# Patient Record
Sex: MALE | Race: WHITE | NOT HISPANIC OR LATINO | Employment: PART TIME | URBAN - METROPOLITAN AREA
[De-identification: names, ages, dates, MRNs, and addresses within clinical notes are randomized per-mention and may not be internally consistent; named-entity substitution may affect disease eponyms.]

---

## 2017-06-05 DIAGNOSIS — K21.9 GASTROESOPHAGEAL REFLUX DISEASE, ESOPHAGITIS PRESENCE NOT SPECIFIED: Primary | ICD-10-CM

## 2017-06-05 RX ORDER — OMEPRAZOLE 40 MG/1
40 CAPSULE, DELAYED RELEASE ORAL DAILY
Qty: 30 CAPSULE | Refills: 6 | Status: SHIPPED | OUTPATIENT
Start: 2017-06-05

## 2017-06-05 RX ORDER — OMEPRAZOLE 40 MG/1
40 CAPSULE, DELAYED RELEASE ORAL DAILY
COMMUNITY
End: 2017-06-05 | Stop reason: SDUPTHER

## 2019-07-19 PROBLEM — D14.1 BENIGN NEOPLASM OF LARYNX: Status: ACTIVE | Noted: 2019-07-19

## 2023-05-10 PROBLEM — Z85.51 HISTORY OF BLADDER CANCER: Status: ACTIVE | Noted: 2023-05-10

## 2023-05-10 PROBLEM — B35.1 ONYCHOMYCOSIS: Status: ACTIVE | Noted: 2023-05-10

## 2023-05-10 PROBLEM — F32.0 CURRENT MILD EPISODE OF MAJOR DEPRESSIVE DISORDER WITHOUT PRIOR EPISODE: Status: ACTIVE | Noted: 2023-05-10

## 2023-05-10 PROBLEM — M51.9 LUMBAR DISC DISEASE: Status: ACTIVE | Noted: 2023-05-10

## 2023-05-10 PROBLEM — N18.31 STAGE 3A CHRONIC KIDNEY DISEASE: Status: ACTIVE | Noted: 2023-05-10

## 2023-05-10 PROBLEM — R25.1 TREMOR: Status: ACTIVE | Noted: 2023-05-10

## 2023-05-10 PROBLEM — N25.81 SECONDARY HYPERPARATHYROIDISM OF RENAL ORIGIN: Status: ACTIVE | Noted: 2023-05-10

## 2023-05-10 PROBLEM — E29.1 HYPOGONADISM IN MALE: Status: ACTIVE | Noted: 2023-05-10

## 2023-05-10 PROBLEM — H40.9 GLAUCOMA: Status: ACTIVE | Noted: 2023-05-10

## 2024-09-24 ENCOUNTER — LAB VISIT (OUTPATIENT)
Dept: LAB | Facility: HOSPITAL | Age: 74
End: 2024-09-24
Attending: UROLOGY
Payer: MEDICARE

## 2024-09-24 DIAGNOSIS — E29.1 3-OXO-5 ALPHA-STEROID DELTA 4-DEHYDROGENASE DEFICIENCY: Primary | ICD-10-CM

## 2024-09-24 DIAGNOSIS — Z85.46 PERSONAL HISTORY OF MALIGNANT NEOPLASM OF PROSTATE: ICD-10-CM

## 2024-09-24 LAB
COMPLEXED PSA SERPL-MCNC: <0.01 NG/ML (ref 0–4)
ERYTHROCYTE [DISTWIDTH] IN BLOOD BY AUTOMATED COUNT: 12.4 % (ref 11.5–14.5)
ESTRADIOL SERPL-MCNC: 11 PG/ML (ref 11–44)
HCT VFR BLD AUTO: 34.9 % (ref 40–54)
HGB BLD-MCNC: 11.4 G/DL (ref 14–18)
MCH RBC QN AUTO: 29.6 PG (ref 27–31)
MCHC RBC AUTO-ENTMCNC: 32.7 G/DL (ref 32–36)
MCV RBC AUTO: 91 FL (ref 82–98)
PLATELET # BLD AUTO: 311 K/UL (ref 150–450)
PMV BLD AUTO: 8.5 FL (ref 9.2–12.9)
RBC # BLD AUTO: 3.85 M/UL (ref 4.6–6.2)
TESTOST SERPL-MCNC: >1500 NG/DL (ref 304–1227)
WBC # BLD AUTO: 13.72 K/UL (ref 3.9–12.7)

## 2024-09-24 PROCEDURE — 36415 COLL VENOUS BLD VENIPUNCTURE: CPT | Performed by: UROLOGY

## 2024-09-24 PROCEDURE — 85027 COMPLETE CBC AUTOMATED: CPT | Performed by: UROLOGY

## 2024-09-24 PROCEDURE — 84403 ASSAY OF TOTAL TESTOSTERONE: CPT | Performed by: UROLOGY

## 2024-09-24 PROCEDURE — 82670 ASSAY OF TOTAL ESTRADIOL: CPT | Performed by: UROLOGY

## 2024-09-24 PROCEDURE — 84153 ASSAY OF PSA TOTAL: CPT | Performed by: UROLOGY

## 2024-11-08 ENCOUNTER — LAB VISIT (OUTPATIENT)
Dept: LAB | Facility: HOSPITAL | Age: 74
End: 2024-11-08
Attending: UROLOGY
Payer: MEDICARE

## 2024-11-08 DIAGNOSIS — E29.1 3-OXO-5 ALPHA-STEROID DELTA 4-DEHYDROGENASE DEFICIENCY: ICD-10-CM

## 2024-11-08 DIAGNOSIS — Z85.46 PERSONAL HISTORY OF MALIGNANT NEOPLASM OF PROSTATE: ICD-10-CM

## 2024-11-08 PROCEDURE — 82679 ASSAY OF ESTRONE: CPT | Performed by: UROLOGY

## 2024-11-12 LAB — ESTRONE SERPL-MCNC: 11 PG/ML

## 2024-11-22 PROBLEM — G93.41 ACUTE METABOLIC ENCEPHALOPATHY: Status: ACTIVE | Noted: 2024-11-22

## 2024-11-22 PROBLEM — Z71.89 ACP (ADVANCE CARE PLANNING): Status: ACTIVE | Noted: 2024-11-22

## 2024-11-22 PROBLEM — D64.9 NORMOCYTIC ANEMIA: Status: ACTIVE | Noted: 2024-11-22

## 2024-11-22 PROBLEM — I10 PRIMARY HYPERTENSION: Status: ACTIVE | Noted: 2024-11-22

## 2024-11-22 PROBLEM — E78.5 HYPERLIPIDEMIA: Status: ACTIVE | Noted: 2024-11-22

## 2024-11-22 PROBLEM — N39.0 COMPLICATED UTI (URINARY TRACT INFECTION): Status: ACTIVE | Noted: 2024-11-22

## 2024-11-25 PROBLEM — Z73.6 LIMITATION OF ACTIVITY DUE TO DISABILITY: Status: ACTIVE | Noted: 2024-11-25

## 2025-01-15 DIAGNOSIS — R41.3 MEMORY LOSS: Primary | ICD-10-CM

## 2025-01-26 ENCOUNTER — TELEPHONE (OUTPATIENT)
Dept: UROLOGY | Facility: CLINIC | Age: 75
End: 2025-01-26
Payer: MEDICARE

## 2025-01-26 NOTE — TELEPHONE ENCOUNTER
New pt scheduled next week noting est care hx bladder cancer    Has see Dr Cristian Tidwell [phone]  As well prior saw Dr Neeraj Cortez ([facility]) 2022 and before    Please request ALL urology records including clinic notes, op and procedure notes, labs, urine tests, pathology reports from BOTH providers in advance of visit

## 2025-02-03 NOTE — PROGRESS NOTES
[facility] New Patient/H&P:     Kushal Morgan is a 74 y.o. male with history of bladder cancer (and prostate cancer) 10 yrs s/p cystectomy and neobladder presents to establish care    He has been followed by Dr Cristian Tidwell for >12 years and presents to establish/transition urologic care  176 pages of urologic medical record received and reviewed.   In summary:    TURBT in 2014, Testopel 2009, inflatable penile prosthesis in 2016, robotic cystectomy with neobladder in March 2015 tiffanie. Incidental neoplasm of prostate noted on pathology specimen for his bladder cancer  Previous surveillance cystoscopy 9/11/19, 3/13/19, 9/12/18 negative  3/13/19 surveillance scope noted moderate bladder neck contracture and mild trabeculation of bladder on 3/13/19.  3/20/20 was noted incontinence related to his prostatectomy with stress incontinence managed with Bentyl and Levbid daily which handles the incontinence well and he does not wear a pad but does leak urine with coughing laughing sneezing or bearing down. Cysto negative  3/10/21:  Surveillance cystoscopy with greater than 5 years no evidence of disease and negative workup with Dr. Cortez recently.  260 mg testosterone cypionate IM q.2 weeks.  Incontinence and low libido reported.  IPP in place.  Surveillance cystoscopy with no lesions in neobladder but ureteral orifices not identified.  Recommended yearly cystoscopy  Record review notes annual cystoscopy at least negative with varying levels of testosterone dosing every 2 weeks from 260-300 mg every 2 weeks with anastrozole use back through 2017.  He was using TriMix intracavernosal injection and vacuum erection device prior to inflatable penile prosthesis placement, which was done in November of 2016 (op report reviewed 11/17/16 with Amicar inflatable penile prosthesis 16 cm device with 1 cm rear-tip extenders used    8/27/19 PSA less than 0.07  9/19/22 creatinine 1.70, EGFR 43, PSA less than 0.1  9/13/23:  PSA  LESS THAN 0.02, TESTOSTERONE 619, CREATININE 1.48, EGFR 50  Renal ultrasound 9/20/16 status post cystectomy had intrinsic medical renal disease but no dilation of collecting systems    Before cystectomy bone scan in 12/20/14 was negative for any evidence of metastases as there was an iliac bone lesion on CT as well as 4-5 mm right lung lesion.  Was concern for clinical T4 disease and was going to see Oncology to discuss adjuvant chemo  TURBT was 12/4/14 in which just above on either side of the prostatic urethral multiple papillary TCC lesions which replace the urothelium radially and papillary fronds extending into the bladder neck radially with urethral encroachment by BPH and tumor burden and there was TCC of the trigone but ureteral orifices were seen and tumor was within 2-3 mm them which had some extension of the papillary lesions to the right bladder wall with a cluster of superficial lesions behind the trigone with a moderately trabeculated bladder and had TURBT/TURP for diagnostics noting invasive high-grade urothelial carcinoma, T1 on initial resection  Neobladder and cystectomy noted negative nodes and pathologic T 0 but anatomic T3 lesion and prostate adenocarcinoma [place] 6 unless than 5% of the volume  Prior to diagnosis of bladder cancer is being followed for BPH/LUTS as well as ED and testosterone replacement since at least 2013 at which time he had a PSA of 2.45    He was last seen 10/16/24 by Dr. Rodriguez for six-month checkup noting radical cystoprostatectomy with neobladder 9 years ago for large tumor but was T1 and the patient has noticed some urinary frequency and leakage over the last 3 months.  There was an incidental neoplasm of prostate on pathology specimen and [place] 3 + 3 and less than 5% with last PSA less than 0.1.  Penile prosthesis done in 2016 and the patient does not see the device outside the skin.  His most recent testosterone level was 133 currently using 140 mg of testosterone  cypionate it was not sure of the timing between labs and injection currently satisfied with his therapy bladder ultrasound done with neobladder not distended and renal ultrasound reviewed with mild renal sitting but no hydronephrosis  Chart reviewed with Q six-month visits leading up to the most recent visit without any significant change in management, though on September 22 visit was noted that left cylinder was mildly underinflated upon patient inflating however with the additional pumps did inflate to adequate rigidity.  Reassurance was provided.    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  Currently being worked up for memory loss and dementia. Wife provides majority of history  Has Alzheimer's in symptoms but testing has been negative including an ATN profile which had mild increase in beta amyloid but was otherwise unremarkable on 1/6/25. Has been seeing Dr Booker neurology and has pending consultation with Dr Quezada dementia neurology specialist [place] [facility]11[place]25.  Reports hospitalization for UTI in 11/20/24 and never got back to baseline mental status after discharge    On review 11/23-11/25 [facility] admit   presents to the ED for evaluation of continuing weakness and confusion. Patient's wife reports that the patient has been experiencing cognitive decline for the past year. Symptoms include confusion, blankly staring at objects, weakness, shuffling gait, and falling. Wife reports a fall last night and the day before. Wife reports patient underwent an MRI recently that showed no acute findings. Labs in ED reveal acute cystitis and patient is currently confused and required ativan.   - UA was abnormal. Elevated pro calcitonin noted. Renal us with stable findings. Patient was initiated on Rocephin and Doxy. Urine and blood cultures monitored. Negative viral respiratory panel. Baseline hx of progressive cognitive decline and following with neurology  outpatient (Dr. Harden) with evaluation on going. Urine culture grew MSSA  - Antibiotics tailored accordingly. Blood cultures remained negative. No clinical s/s of underlying deep seated infection and no high risk factors for IE and no WOLFGANG required. Patient remained stable from infection control stand point. Patient with some sundowning/nocturnal delirium and supportive care provided. Patient's mentation slowly improved   Urine culture was greater than 100,000 colony-forming units of Staph aureus  No dysuria since that time.  No distinct urinary complaints except for incontinence.  Signs of dementia with progressive, but neurologic evaluation with note of early dementia in October of 2024 prior to hospital admission.  Has had some baseline stress incontinence as above per chart review, but now notes he is leaking more at night.  No real daytime incontinence, and does wear depend during the day for convenience.  At night, can use up to 5.  Only occasional mucus in the urine  Has been trying to stop fluid intake 2 hours before bed but does wake up at night and eat and drink.  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  Has continued testosterone replacement and anastrozole but wonders if needs it  Taking hyoscyamine twice daily  Renal function review 11/25/24 at time of hospital discharge creatinine 1.86, EGFR 38.  At baseline.    Main questions/concerns at this visit:  - management of his significant nocturnal incontinence (inquired if TID sudafed would help as has been told)  - what medications does he destiny to be on and what may contribute, geovanna in setting of decline in mental status    Past Medical History:   Diagnosis Date    Anesthesia complication     recover from versed very slowly    Cancer     bladder and prostate    Depression     GERD  "(gastroesophageal reflux disease)     Glaucoma     Hyperlipidemia     Hypertension     "benign"/ no current meds    Incontinent of urine     under anesthesia    Insomnia     Intractable hiccups     during chemo    Lumbar disc disease     KANNAN (obstructive sleep apnea)     noncompliant with CPAP    PONV (postoperative nausea and vomiting)     Renal disorder        Past Surgical History:   Procedure Laterality Date    BLADDER SURGERY      removal of bladder due to bladder CA, creation of bladder using intestines.    COLONOSCOPY  10/14/2024    DIRECT LARYNGOSCOPY N/A 07/19/2019    Procedure: LARYNGOSCOPY, DIRECT;  Surgeon: Jace Stephenson MD;  Location: [facility];  Service: ENT;  Laterality: N/A;    ESOPHAGOSCOPY N/A 07/19/2019    Procedure: ESOPHAGOSCOPY;  Surgeon: Jace Stephenson MD;  Location: [facility];  Service: ENT;  Laterality: N/A;    HERNIA REPAIR Left 2016    inguinal    MICROLARYNGOSCOPY WITH PROCEDURE USING LASER  07/19/2019    Procedure: MICROLARYNGOSCOPY, WITH PROCEDURE USING LASER;  Surgeon: Jace Stephenson MD;  Location: [facility];  Service: ENT;;    PENILE PROSTHESIS IMPLANT  2018    PORTACATH PLACEMENT Right     cw    PROSTATECTOMY      ROTATOR CUFF REPAIR      right       Family History   Problem Relation Name Age of Onset    Cancer Mother          breast    Cancer Father          sarcoma    Depression Sister      No Known Problems Brother jazmin     Depression Brother luisana     Depression Brother maggi     Stroke Maternal Grandmother         Social History     Socioeconomic History    Marital status:    Tobacco Use    Smoking status: Never    Smokeless tobacco: Never   Substance and Sexual Activity    Alcohol use: Yes     Alcohol/week: 14.0 standard drinks of alcohol     Types: 14 Glasses of wine per week     Comment: daily- share bottle wine with wife    Drug use: No     Social Drivers of Health     Food Insecurity: No Food Insecurity (11/23/2024)    Hunger Vital Sign     Worried About " "Running Out of Food in the Last Year: Never true     Ran Out of Food in the Last Year: Never true   Transportation Needs: No Transportation Needs (11/23/2024)    TRANSPORTATION NEEDS     Transportation : No   Housing Stability: High Risk (11/23/2024)    Housing Stability Vital Sign     Unable to Pay for Housing in the Last Year: Yes     Homeless in the Last Year: Yes       Review of patient's allergies indicates:  No Known Allergies    Medications Reviewed: see MAR    Focused Physical Exam    Vitals:    02/04/25 1046   BP: 124/77     Body mass index is 24.14 kg/m². Weight: 59.9 kg (132 lb) Height: 5' 2" (157.5 cm)       Abdomen: Soft, non-tender, nondistended, no CVA tenderness    LABS:    Recent Results (from the past 2 weeks)   POCT Bladder Scan    Collection Time: 02/04/25 10:52 AM   Result Value Ref Range    POC Residual Urine Volume 45 0 - 100 mL   POCT Urinalysis(Instrument)    Collection Time: 02/04/25 10:53 AM   Result Value Ref Range    Color, POC UA Yellow Yellow, Straw, Colorless    Clarity, POC UA Clear Clear    Glucose, POC UA Negative Negative    Bilirubin, POC UA Negative Negative    Ketones, POC UA Negative Negative    Spec Grav POC UA 1.025 1.005 - 1.030    Blood, POC UA Moderate (A) Negative    pH, POC UA 5.5 5.0 - 8.0    Protein, POC  (A) Negative    Urobilinogen, POC UA 0.2 <=1.0    Nitrite, POC UA Negative Negative    WBC, POC UA Moderate (A) Negative           Assessment/Diagnosis:    1. History of bladder cancer  POCT Urinalysis(Instrument)    POCT Bladder Scan    Basic Metabolic Panel    Urine Culture High Risk      2. History of prostate cancer  Prostate Specific Antigen, Diagnostic      3. Cells and casts in urine  Urine Culture High Risk          Plans:    EXTENSIVE review of prior urologic medical record of last 12 years, and long discussion with pt and his wife  Main concern in his clinical care at this time is his dementia/neurologic status  Main urologic concern is his nocturnal " incontinence    In regards to his history of prostate cancer and bladder cancer, reviewed records indicating nonmuscle invasive disease just concern for T3 high-grade T1, as initial concern was T4, but ultimately staged as T3, and pathology was tea 0 at time of radical cystoprostatectomy.  Incidental finding of low-grade prostate cancer, not uncommon at time of cystectomy.  He has finished surveillance for his bladder cancer, and his PSA has remained undetectable as expected.  No oncologic concerns at this time.    He does have neobladder, and I was very upfront with the patient and his wife that advanced bladder cancer such as cystectomy and diversion is the 1 thing not typically managed in his practice, and typically patients with ileal conduit seen.  Little experience with neobladder, but given bowel segment and different receptors, medical management does defer.  I am not familiar with the question of if Sudafed may decrease incontinence in the neobladder, and certainly given his issue is not during the day and only nighttime, sometimes desmopressin has been utilized, and will review neobladder incontinence management with urologic oncology for further recs    In interim reviewed all conservative recommendations for urgency and frequency, and certainly contributing to nocturia could be his evening alcohol intake, and drinking through the night.  Distinctly recommended stopping fluid intake 2 hours before bed, minimizing bladder irritants in the afternoon and evening hours, and not drinking through the night.  All conservative recommendations for urgency and frequency were reviewed and provided in writing.    As well we did review his testosterone replacement therapy.  Slight risk of increase of recurrence of prostate cancer at though with small amount of low-grade disease resected many years ago an undetectable PSA despite testosterone placement, no distinct concerns there.  Unclear of the history leading to  testosterone replacement but largely noted was utilized for ED, but has since had penile prosthesis, and therefore risks likely outweigh the benefits, especially if his dosing has been adjusted so much that it doses too high for which he needs anastrozole for the peripheral conversion to estrogen, and at this time mutually discussed discontinuing testosterone replacement and anastrozole.    In regards to medications that may contribute to dementia or any other problems, recommended he stop hycosamine at this time given an anticholinergic effect which can affect his mentation.  As well, recommended distinct review of all medications he is on with the neurologist at his visit next week.    Given urine dipstick today, and admission a few months ago with concern for UTI/urosepsis, will send a urine culture    A routine six-month follow-up was set.    In the interim will reach out to Urologic Oncology regarding recommendations for neobladder nocturnal incontinence      Total time spent in/on encounter today, including face to face time with patient, counseling, medical record review, interpretation of tests/results, , and treatment plan coordination: 140 minutes    *Visit today included increased complexity associated with the current or anticipated ongoing medical longitudinal care and management related to this patient's serious and/or complex managed problem(s) as described above.

## 2025-02-03 NOTE — TELEPHONE ENCOUNTER
Personally spoke with Dr Yaw Wick she voiced several papers was faxed as pt has a large file has been a pt there for  10 yrs.  She will try to refax and was also provided my personal work email

## 2025-02-04 ENCOUNTER — OFFICE VISIT (OUTPATIENT)
Dept: UROLOGY | Facility: CLINIC | Age: 75
End: 2025-02-04
Payer: MEDICARE

## 2025-02-04 ENCOUNTER — LAB VISIT (OUTPATIENT)
Dept: LAB | Facility: HOSPITAL | Age: 75
End: 2025-02-04
Attending: UROLOGY
Payer: MEDICARE

## 2025-02-04 VITALS
SYSTOLIC BLOOD PRESSURE: 124 MMHG | WEIGHT: 132 LBS | DIASTOLIC BLOOD PRESSURE: 77 MMHG | BODY MASS INDEX: 24.29 KG/M2 | HEIGHT: 62 IN

## 2025-02-04 DIAGNOSIS — R82.998 CELLS AND CASTS IN URINE: ICD-10-CM

## 2025-02-04 DIAGNOSIS — Z85.46 HISTORY OF PROSTATE CANCER: ICD-10-CM

## 2025-02-04 DIAGNOSIS — Z85.51 HISTORY OF BLADDER CANCER: Primary | ICD-10-CM

## 2025-02-04 DIAGNOSIS — R82.998 CELLS AND CASTS IN URINE: Primary | ICD-10-CM

## 2025-02-04 DIAGNOSIS — Z85.51 HISTORY OF BLADDER CANCER: ICD-10-CM

## 2025-02-04 LAB
ANION GAP SERPL CALC-SCNC: 9 MMOL/L (ref 8–16)
BILIRUBIN, UA POC OHS: NEGATIVE
BLOOD, UA POC OHS: ABNORMAL
BUN SERPL-MCNC: 36 MG/DL (ref 8–23)
CALCIUM SERPL-MCNC: 9.6 MG/DL (ref 8.7–10.5)
CHLORIDE SERPL-SCNC: 104 MMOL/L (ref 95–110)
CLARITY, UA POC OHS: CLEAR
CO2 SERPL-SCNC: 24 MMOL/L (ref 23–29)
COLOR, UA POC OHS: YELLOW
COMPLEXED PSA SERPL-MCNC: <0.01 NG/ML (ref 0–4)
CREAT SERPL-MCNC: 1.6 MG/DL (ref 0.5–1.4)
EST. GFR  (NO RACE VARIABLE): 45 ML/MIN/1.73 M^2
GLUCOSE SERPL-MCNC: 88 MG/DL (ref 70–110)
GLUCOSE, UA POC OHS: NEGATIVE
KETONES, UA POC OHS: NEGATIVE
LEUKOCYTES, UA POC OHS: ABNORMAL
NITRITE, UA POC OHS: NEGATIVE
PH, UA POC OHS: 5.5
POC RESIDUAL URINE VOLUME: 45 ML (ref 0–100)
POTASSIUM SERPL-SCNC: 4.8 MMOL/L (ref 3.5–5.1)
PROTEIN, UA POC OHS: 100
SODIUM SERPL-SCNC: 137 MMOL/L (ref 136–145)
SPECIFIC GRAVITY, UA POC OHS: 1.02
UROBILINOGEN, UA POC OHS: 0.2

## 2025-02-04 PROCEDURE — 87086 URINE CULTURE/COLONY COUNT: CPT | Mod: GA | Performed by: UROLOGY

## 2025-02-04 PROCEDURE — 84153 ASSAY OF PSA TOTAL: CPT | Performed by: UROLOGY

## 2025-02-04 PROCEDURE — 3008F BODY MASS INDEX DOCD: CPT | Mod: CPTII,S$GLB,, | Performed by: UROLOGY

## 2025-02-04 PROCEDURE — 81003 URINALYSIS AUTO W/O SCOPE: CPT | Mod: QW,S$GLB,, | Performed by: UROLOGY

## 2025-02-04 PROCEDURE — G2211 COMPLEX E/M VISIT ADD ON: HCPCS | Mod: S$GLB,,, | Performed by: UROLOGY

## 2025-02-04 PROCEDURE — 36415 COLL VENOUS BLD VENIPUNCTURE: CPT | Performed by: UROLOGY

## 2025-02-04 PROCEDURE — 3078F DIAST BP <80 MM HG: CPT | Mod: CPTII,S$GLB,, | Performed by: UROLOGY

## 2025-02-04 PROCEDURE — 99999 PR PBB SHADOW E&M-EST. PATIENT-LVL III: CPT | Mod: PBBFAC,,, | Performed by: UROLOGY

## 2025-02-04 PROCEDURE — 51798 US URINE CAPACITY MEASURE: CPT | Mod: S$GLB,,, | Performed by: UROLOGY

## 2025-02-04 PROCEDURE — 1126F AMNT PAIN NOTED NONE PRSNT: CPT | Mod: CPTII,S$GLB,, | Performed by: UROLOGY

## 2025-02-04 PROCEDURE — 99205 OFFICE O/P NEW HI 60 MIN: CPT | Mod: S$GLB,,, | Performed by: UROLOGY

## 2025-02-04 PROCEDURE — 1159F MED LIST DOCD IN RCRD: CPT | Mod: CPTII,S$GLB,, | Performed by: UROLOGY

## 2025-02-04 PROCEDURE — 80048 BASIC METABOLIC PNL TOTAL CA: CPT | Performed by: UROLOGY

## 2025-02-04 PROCEDURE — 4010F ACE/ARB THERAPY RXD/TAKEN: CPT | Mod: CPTII,S$GLB,, | Performed by: UROLOGY

## 2025-02-04 PROCEDURE — 3074F SYST BP LT 130 MM HG: CPT | Mod: CPTII,S$GLB,, | Performed by: UROLOGY

## 2025-02-04 PROCEDURE — 87088 URINE BACTERIA CULTURE: CPT | Performed by: UROLOGY

## 2025-02-04 PROCEDURE — 99417 PROLNG OP E/M EACH 15 MIN: CPT | Mod: S$GLB,,, | Performed by: UROLOGY

## 2025-02-04 RX ORDER — BIMATOPROST 0.3 MG/ML
1 SOLUTION/ DROPS OPHTHALMIC NIGHTLY
COMMUNITY

## 2025-02-04 RX ORDER — ERGOCALCIFEROL 1.25 MG/1
50000 CAPSULE ORAL
COMMUNITY
Start: 2024-12-20

## 2025-02-04 RX ORDER — METOCLOPRAMIDE 5 MG/1
5 TABLET ORAL 4 TIMES DAILY
COMMUNITY
Start: 2025-01-10

## 2025-02-06 LAB — BACTERIA UR CULT: ABNORMAL

## 2025-02-09 DIAGNOSIS — R30.0 DYSURIA: Primary | ICD-10-CM

## 2025-02-10 ENCOUNTER — TELEPHONE (OUTPATIENT)
Dept: UROLOGY | Facility: CLINIC | Age: 75
End: 2025-02-10
Payer: MEDICARE

## 2025-02-10 NOTE — TELEPHONE ENCOUNTER
Pts wife informed of md akhtar  Lab orders scheduled     Uti admit in nov was >100k staph aureus  So low colony count coag neg staph unlikely to be clinically sig UTI  But if concern for symtpoms, can repeat.  [place]ives [place] so can do [place] lab visit for ua, ua micro, ucx - all 3 order placed as lab collect so can schedule/assoc all per pt convenience  Make sure asks lab for towelette, and pt collects midstream (voiding into toilet a bit first before collecting in cup)

## 2025-02-11 ENCOUNTER — OFFICE VISIT (OUTPATIENT)
Dept: NEUROLOGY | Facility: CLINIC | Age: 75
End: 2025-02-11
Payer: MEDICARE

## 2025-02-11 VITALS — WEIGHT: 134.5 LBS | OXYGEN SATURATION: 98 % | BODY MASS INDEX: 24.75 KG/M2 | HEIGHT: 62 IN

## 2025-02-11 DIAGNOSIS — M51.9 LUMBAR DISC DISEASE: ICD-10-CM

## 2025-02-11 DIAGNOSIS — R41.3 MEMORY LOSS: ICD-10-CM

## 2025-02-11 DIAGNOSIS — N39.0 COMPLICATED UTI (URINARY TRACT INFECTION): ICD-10-CM

## 2025-02-11 DIAGNOSIS — Z85.51 HISTORY OF BLADDER CANCER: ICD-10-CM

## 2025-02-11 DIAGNOSIS — F03.90 DEMENTIA, UNSPECIFIED DEMENTIA SEVERITY, UNSPECIFIED DEMENTIA TYPE, UNSPECIFIED WHETHER BEHAVIORAL, PSYCHOTIC, OR MOOD DISTURBANCE OR ANXIETY: Primary | ICD-10-CM

## 2025-02-11 DIAGNOSIS — E78.5 HYPERLIPIDEMIA, UNSPECIFIED HYPERLIPIDEMIA TYPE: ICD-10-CM

## 2025-02-11 PROCEDURE — 1126F AMNT PAIN NOTED NONE PRSNT: CPT | Mod: CPTII,S$GLB,, | Performed by: STUDENT IN AN ORGANIZED HEALTH CARE EDUCATION/TRAINING PROGRAM

## 2025-02-11 PROCEDURE — 1101F PT FALLS ASSESS-DOCD LE1/YR: CPT | Mod: CPTII,S$GLB,, | Performed by: STUDENT IN AN ORGANIZED HEALTH CARE EDUCATION/TRAINING PROGRAM

## 2025-02-11 PROCEDURE — 4010F ACE/ARB THERAPY RXD/TAKEN: CPT | Mod: CPTII,S$GLB,, | Performed by: STUDENT IN AN ORGANIZED HEALTH CARE EDUCATION/TRAINING PROGRAM

## 2025-02-11 PROCEDURE — 3288F FALL RISK ASSESSMENT DOCD: CPT | Mod: CPTII,S$GLB,, | Performed by: STUDENT IN AN ORGANIZED HEALTH CARE EDUCATION/TRAINING PROGRAM

## 2025-02-11 PROCEDURE — 1160F RVW MEDS BY RX/DR IN RCRD: CPT | Mod: CPTII,S$GLB,, | Performed by: STUDENT IN AN ORGANIZED HEALTH CARE EDUCATION/TRAINING PROGRAM

## 2025-02-11 PROCEDURE — 99205 OFFICE O/P NEW HI 60 MIN: CPT | Mod: S$GLB,,, | Performed by: STUDENT IN AN ORGANIZED HEALTH CARE EDUCATION/TRAINING PROGRAM

## 2025-02-11 PROCEDURE — 1159F MED LIST DOCD IN RCRD: CPT | Mod: CPTII,S$GLB,, | Performed by: STUDENT IN AN ORGANIZED HEALTH CARE EDUCATION/TRAINING PROGRAM

## 2025-02-11 PROCEDURE — 3008F BODY MASS INDEX DOCD: CPT | Mod: CPTII,S$GLB,, | Performed by: STUDENT IN AN ORGANIZED HEALTH CARE EDUCATION/TRAINING PROGRAM

## 2025-02-11 PROCEDURE — 99999 PR PBB SHADOW E&M-EST. PATIENT-LVL IV: CPT | Mod: PBBFAC,,, | Performed by: STUDENT IN AN ORGANIZED HEALTH CARE EDUCATION/TRAINING PROGRAM

## 2025-02-11 RX ORDER — MEMANTINE HYDROCHLORIDE 5 MG/1
5 TABLET ORAL 2 TIMES DAILY
Qty: 60 TABLET | Refills: 11 | Status: SHIPPED | OUTPATIENT
Start: 2025-02-11 | End: 2026-02-11

## 2025-02-11 RX ORDER — BENZONATATE 200 MG/1
200 CAPSULE ORAL 3 TIMES DAILY PRN
COMMUNITY

## 2025-02-12 PROBLEM — F03.90 DEMENTIA, UNSPECIFIED DEMENTIA SEVERITY, UNSPECIFIED DEMENTIA TYPE, UNSPECIFIED WHETHER BEHAVIORAL, PSYCHOTIC, OR MOOD DISTURBANCE OR ANXIETY: Status: ACTIVE | Noted: 2025-02-12

## 2025-02-12 NOTE — PROGRESS NOTES
Patient Name: Kushal Morgan  MRN: [id number]    CC: Cognitive dysfunction     HPI: Kushal Morgan is a 74 y.o. male with medical history of Bladder CA, HTN, HLD, LDDD, GERD, mood disorder presenting to the neurology clinic for establishment of care with myself for further evaluation and management of cognitive dysfunction.     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 Referral from Leoncio Booker - Neurologist at [place].     Reported of symptoms pertinent to memory deficits over an year - since Jan 2024, insidious onset, with gradual worsening / evident symptoms. Pattern of memory loss - recent recall followed by immediate recall, with intact remote memory. However, since UTI in October 2025 - had significant decline in cognitive function as per significant other. Symptoms in spectrum of poor attention / concentration / focus / mood - events of blank stare's - delayed response to questions; wo affect in level of consciousness. Reports of less motivation / interest in major of his prior usual activities / poor socialization. Added concerns includes word finding difficulties during conversations; with spontaneous random fill in gaps in conversations; wo rationale thinking and abstraction. Memory of vocabulary and concepts appears intact. Procedural memory and motor learning appears relatively spared; however significant disorganization in thoughts and actions. Independent - able to take of day to day activities wo any obvious apraxia or agnosia - however events of affect in following commands (Ex - act of putting trash in closet rather in kitchen). Mood factors of irritability reported / wo significant agitations or emotional outbursts. Events of in-appropriate behavior was reported / walking down the stairs in underwear / not realizing the day of the time. However no clear patterns of any other disinhibition or hyperorality or compulsive behaviors reported. Denied any  "concerns for hallucinations or delusions. No obvious signs or symptom spectrum of parkinsonism or PSP / MSA / CBS. Denied any clinical seizures or history of stroke / epilepsy and complex migraines.     Reported of extensive serum / advanced neuroimaging investigations performed prior to the visit / however no documentation scanned to [facility].     ROS:   Review of Systems   Constitutional: Positive for malaise/fatigue. Negative for weight loss.   Eyes: Negative for blurred vision and double vision.   Respiratory: Cardiovascular: Negative / no active reportable concerns.   Gastrointestinal: Genitourinary: Negative / no active reportable concerns.   Musculoskeletal: Negative for joint pain. Negative for myalgias and falls.   Neurological:  Negative for focal weakness and seizures.   Psychiatric/Behavioral: Positive for memory loss. Positive for depression and negative for hallucinations. The patient is not nervous/anxious.    Past Medical History  Past Medical History:   Diagnosis Date    Anesthesia complication     recover from versed very slowly    Cancer     bladder and prostate    Depression     GERD (gastroesophageal reflux disease)     Glaucoma     Hyperlipidemia     Hypertension     "benign"/ no current meds    Incontinent of urine     under anesthesia    Insomnia     Intractable hiccups     during chemo    Lumbar disc disease     KANNAN (obstructive sleep apnea)     noncompliant with CPAP    PONV (postoperative nausea and vomiting)     Renal disorder        Medications    Current Outpatient Medications:     atorvastatin (LIPITOR) 10 MG tablet, Take 10 mg by mouth every Mon, Wed, Fri. PM, Disp: , Rfl:     benzonatate (TESSALON) 200 MG capsule, Take 200 mg by mouth 3 (three) times daily as needed for Cough., Disp: , Rfl:     bimatoprost (LUMIGAN) 0.03 % ophthalmic drops, Place 1 drop into both eyes every evening., Disp: , Rfl:     brimonidine 0.2% (ALPHAGAN) 0.2 % Drop, Place 1 drop into both eyes 2 (two) " times daily. , Disp: , Rfl:     buPROPion (WELLBUTRIN XL) 300 MG 24 hr tablet, Take 300 mg by mouth every morning., Disp: , Rfl:     cyanocobalamin 1,000 mcg/mL injection, Inject 1,500 mcg into the muscle every 14 (fourteen) days., Disp: , Rfl:     dorzolamide-timolol 2-0.5% (COSOPT) 22.3-6.8 mg/mL ophthalmic solution, Place 1 drop into both eyes 2 (two) times daily. , Disp: , Rfl:     ergocalciferol (ERGOCALCIFEROL) 50,000 unit Cap, Take 50,000 Units by mouth every 7 days., Disp: , Rfl:     famotidine (PEPCID) 20 MG tablet, Take 20 mg by mouth 2 (two) times daily as needed for Heartburn., Disp: , Rfl:     gabapentin (NEURONTIN) 300 MG capsule, Take 1 capsule (300 mg total) by mouth 2 (two) times daily., Disp: , Rfl:     lansoprazole (PREVACID) 30 MG capsule, Take 30 mg by mouth 2 (two) times daily., Disp: , Rfl:     meloxicam (MOBIC) 15 MG tablet, Take 15 mg by mouth once daily., Disp: , Rfl:     metoclopramide HCl (REGLAN) 5 MG tablet, Take 5 mg by mouth 4 (four) times daily., Disp: , Rfl:     ramipriL (ALTACE) 2.5 MG capsule, Take 2.5 mg by mouth once daily., Disp: , Rfl:     traZODone (DESYREL) 100 MG tablet, Take 100-200 mg by mouth every evening., Disp: , Rfl:     memantine (NAMENDA) 5 MG Tab, Take 1 tablet (5 mg total) by mouth 2 (two) times daily., Disp: 60 tablet, Rfl: 11    Allergies  Review of patient's allergies indicates:  No Known Allergies    Social History  Social History     Socioeconomic History    Marital status:    Tobacco Use    Smoking status: Never    Smokeless tobacco: Never   Substance and Sexual Activity    Alcohol use: Yes     Alcohol/week: 14.0 standard drinks of alcohol     Types: 14 Glasses of wine per week     Comment: daily- share bottle wine with wife    Drug use: No     Social Drivers of Health     Food Insecurity: No Food Insecurity (11/23/2024)    Hunger Vital Sign     Worried About Running Out of Food in the Last Year: Never true     Ran Out of Food in the Last Year: Never  "true   Transportation Needs: No Transportation Needs (11/23/2024)    TRANSPORTATION NEEDS     Transportation : No   Housing Stability: High Risk (11/23/2024)    Housing Stability Vital Sign     Unable to Pay for Housing in the Last Year: Yes     Homeless in the Last Year: Yes       Family History  Family History   Problem Relation Name Age of Onset    Cancer Mother          breast    Cancer Father          sarcoma    Depression Sister      No Known Problems Brother jazmin     Depression Brother luisana     Depression Brother maggi     Stroke Maternal Grandmother         Physical Exam  Ht 5' 2" (1.575 m)   Wt 61 kg (134 lb 7.7 oz)   SpO2 98%   BMI 24.60 kg/m²     General appearance: Well-developed, well-groomed.     Neurologic Exam: The patient is awake, alert and oriented. Language is fluent. Attention span and concentration are normal.  MOCA 22/30 - 0/5 delayed recall / orientation 4/6 / executive - error on copy cube     Cranial nerves: Visual fields are full to confrontation. Ocular motility is full in all cardinal positions of gaze. Facial activation is symmetric. Hearing is normal bilaterally. Shoulder elevation is symmetric and full strength bilaterally.     Motor examination of all extremities demonstrates normal bulk and tone in all four limbs. There are no atrophy or fasciculations. Strength is 5/5 in the upper and lower extremities bilaterally without pronator drift.     Sensory examination is normal to touch in the upper and lower extremities bilaterally.      Gait: Normal casual gait.    Coordination: Finger to nose is normal in both upper extremities. No resting tremor or dyskinesia.     General exam  Cardiovascular:  Orthostatic BP: N/A    Lab and Test Results    WBC   Date Value Ref Range Status   11/25/2024 9.05 3.90 - 12.70 K/uL Final   11/24/2024 8.72 3.90 - 12.70 K/uL Final   11/23/2024 8.83 3.90 - 12.70 K/uL Final     Hemoglobin   Date Value Ref Range Status   11/25/2024 11.1 (L) 14.0 - 18.0 g/dL " Final   11/24/2024 10.6 (L) 14.0 - 18.0 g/dL Final   11/23/2024 10.0 (L) 14.0 - 18.0 g/dL Final     Hematocrit   Date Value Ref Range Status   11/25/2024 34.1 (L) 40.0 - 54.0 % Final   11/24/2024 32.0 (L) 40.0 - 54.0 % Final   11/23/2024 29.8 (L) 40.0 - 54.0 % Final     Platelets   Date Value Ref Range Status   11/25/2024 222 150 - 450 K/uL Final   11/24/2024 187 150 - 450 K/uL Final   11/23/2024 184 150 - 450 K/uL Final     Glucose   Date Value Ref Range Status   02/04/2025 88 70 - 110 mg/dL Final   11/25/2024 154 (H) 70 - 110 mg/dL Final     Comment:     The ADA recommends the following guidelines for fasting glucose:    Normal:       less than 100 mg/dL    Prediabetes:  100 mg/dL to 125 mg/dL    Diabetes:     126 mg/dL or higher     11/24/2024 130 (H) 70 - 110 mg/dL Final     Comment:     The ADA recommends the following guidelines for fasting glucose:    Normal:       less than 100 mg/dL    Prediabetes:  100 mg/dL to 125 mg/dL    Diabetes:     126 mg/dL or higher       Sodium   Date Value Ref Range Status   02/04/2025 137 136 - 145 mmol/L Final   11/25/2024 141 136 - 145 mmol/L Final   11/24/2024 139 136 - 145 mmol/L Final     Potassium   Date Value Ref Range Status   02/04/2025 4.8 3.5 - 5.1 mmol/L Final   11/25/2024 4.3 3.5 - 5.1 mmol/L Final     Comment:     Anion Gap reference range revised on 4/28/2023 11/24/2024 3.9 3.5 - 5.1 mmol/L Final     Comment:     Anion Gap reference range revised on 4/28/2023     Chloride   Date Value Ref Range Status   02/04/2025 104 95 - 110 mmol/L Final   11/25/2024 104 95 - 110 mmol/L Final   11/24/2024 106 95 - 110 mmol/L Final     CO2   Date Value Ref Range Status   02/04/2025 24 23 - 29 mmol/L Final   11/25/2024 29 22 - 31 mmol/L Final   11/24/2024 27 22 - 31 mmol/L Final     BUN   Date Value Ref Range Status   02/04/2025 36 (H) 8 - 23 mg/dL Final   11/25/2024 33 (H) 9 - 21 mg/dL Final   11/24/2024 30 (H) 9 - 21 mg/dL Final     Creatinine   Date Value Ref Range Status    02/04/2025 1.6 (H) 0.5 - 1.4 mg/dL Final   11/25/2024 1.86 (H) 0.50 - 1.40 mg/dL Final   11/24/2024 1.81 (H) 0.50 - 1.40 mg/dL Final     Calcium   Date Value Ref Range Status   02/04/2025 9.6 8.7 - 10.5 mg/dL Final   11/25/2024 9.6 8.4 - 10.2 mg/dL Final   11/24/2024 9.6 8.4 - 10.2 mg/dL Final     Magnesium   Date Value Ref Range Status   11/22/2024 1.7 1.6 - 2.6 mg/dL Final   11/08/2024 1.9 1.6 - 2.6 mg/dL Final   04/11/2023 1.9 1.6 - 2.6 mg/dL Final     Phosphorus   Date Value Ref Range Status   11/08/2024 3.3 2.7 - 4.5 mg/dL Final   04/11/2023 4.0 2.7 - 4.5 mg/dL Final   10/05/2022 3.5 2.7 - 4.5 mg/dL Final     Alkaline Phosphatase   Date Value Ref Range Status   11/24/2024 90 38 - 145 U/L Final   11/23/2024 86 38 - 145 U/L Final   11/22/2024 85 38 - 145 U/L Final     ALT   Date Value Ref Range Status   11/24/2024 25 0 - 50 U/L Final   11/23/2024 23 0 - 50 U/L Final   11/22/2024 28 0 - 50 U/L Final     AST ([place])   Date Value Ref Range Status   02/25/2016 24 17 - 59 U/L Final   11/11/2015 19 17 - 59 U/L Final     AST   Date Value Ref Range Status   11/24/2024 37 17 - 59 U/L Final   11/23/2024 27 17 - 59 U/L Final   11/22/2024 27 17 - 59 U/L Final       Images: Independently reviewed - Yes   Other Tests: Independently reviewed - Yes     Assessment and Plan    74 y.o. male with medical history of Bladder CA, HTN, HLD, LDDD, GERD, mood disorder presenting to the neurology clinic for establishment of care with myself for further evaluation and management of cognitive dysfunction.     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 Referral from Leoncio Booker - Neurologist at [place].     Reported of symptoms pertinent to memory deficits over an year - since Jan 2024, insidious onset, with gradual worsening / evident symptoms. Pattern of memory loss - recent recall followed by immediate recall, with intact remote memory. However, since UTI in October 2025 - had  significant decline in cognitive function as per significant other. Symptoms in spectrum of poor attention / concentration / focus / mood - events of blank stare's - delayed response to questions; wo affect in level of consciousness. Reports of less motivation / interest in major of his prior usual activities / poor socialization. Added concerns includes word finding difficulties during conversations; with spontaneous random fill in gaps in conversations; wo rationale thinking and abstraction. Memory of vocabulary and concepts appears intact. Procedural memory and motor learning appears relatively spared; however significant disorganization in thoughts and actions. Independent - able to take of day to day activities wo any obvious apraxia or agnosia - however events of affect in following commands (Ex - act of putting trash in closet rather in kitchen). Mood factors of irritability reported / wo significant agitations or emotional outbursts. Events of in-appropriate behavior was reported / walking down the stairs in underwear / not realizing the day of the time. However no clear patterns of any other disinhibition or hyperorality or compulsive behaviors reported. Denied any concerns for hallucinations or delusions. No obvious signs or symptom spectrum of parkinsonism or PSP / MSA / CBS. Denied any clinical seizures or history of stroke / epilepsy and complex migraines.     Reported of extensive serum / advanced neuroimaging investigations performed prior to the visit / however [facility]     MOCA 22/30     Plan  Discussed on the DDx / management. Concerns for dementia syndromes (suspect mixed pattern with vascular / amnestic) with confounding factors any undiagnosed/under diagnosed mood disorders    Recommend / preference for comprehensive evaluation at [facility]   Interim release of records from OSH for further review     - Neuropsych evaluation:     --- To establish a baseline in order to follow the patient over time.    --- To help differentiate among different forms of neurodegenerative dementias   --- To assess competencies and guide recommendations pertaining to driving, financial decisions, and need for increasing supervision.    --- To identify opportunities for compensatory or rehabilitative treatment strategies    - Counseled on pharmacological options : Shall consider initiation of namenda   - Counseled on importance of caregivers / social, family support to meet all of the health and personal needs of the person with dementia  - Counseled on importance of risk factor control / secondary stroke prevention such cardiovascular risk factors as needed  - Counseled on monitoring for early recognition / treatment of behavioral and psychological symptoms of dementia, especially agitation, aggression, depression and psychosis  - Counseled on avoidance of driving / maintanance of good sleep hygiene / healthy dietary patterns, DASH diet.     If any immediate concerns, please seek ER attention or call office for further management    1. Dementia, unspecified dementia severity, unspecified dementia type, unspecified whether behavioral, psychotic, or mood disturbance or anxiety    2. Memory loss  -     Ambulatory referral/consult to Neurology  -     Ambulatory referral/consult to Adult Neuropsychology; Future; Expected date: 02/18/2025  -     Ambulatory referral/consult to Neurology; Future; Expected date: 02/18/2025    3. Hyperlipidemia, unspecified hyperlipidemia type  Assessment & Plan:  F/u lipid panel     - target LDL < 70   - Continue atorvastatin  - F/u PCP for risk factor modification monitoring      4. History of bladder cancer  Assessment & Plan:  No active concerns / + remission       5. Complicated UTI (urinary tract infection)  Assessment & Plan:  - Recent Rx  - No active related encephalopathy       6. Lumbar disc disease  Assessment & Plan:  - Long  term neurotin       Other orders  -     memantine (NAMENDA) 5 MG Tab; Take 1 tablet (5 mg total) by mouth 2 (two) times daily.  Dispense: 60 tablet; Refill: 11               Brittany Goldman MD    General Neurology, [facility],   [address] [facility] [address], [address], [place] [address]    Vascular Neurology, Endovascular Neurosurgery,   [facility], [address] [place] [address], [place] [address]

## 2025-02-12 NOTE — ASSESSMENT & PLAN NOTE
F/u lipid panel     - target LDL < 70   - Continue atorvastatin  - F/u PCP for risk factor modification monitoring

## 2025-02-18 ENCOUNTER — PATIENT MESSAGE (OUTPATIENT)
Dept: NEUROLOGY | Facility: CLINIC | Age: 75
End: 2025-02-18
Payer: MEDICARE

## 2025-03-01 ENCOUNTER — TELEPHONE (OUTPATIENT)
Facility: CLINIC | Age: 75
End: 2025-03-01
Payer: MEDICARE

## 2025-03-01 NOTE — TELEPHONE ENCOUNTER
----- Message from Earnest Koenig MD sent at 2/28/2025  1:08 PM CST -----  Regarding: RE: from [facility]- [facility] [place]  I will see in resource clinic  ----- Message -----  From: Sonia Clarke, RN  Sent: 2/28/2025  11:29 AM CST  To: Earnest Koenig MD; Geronimo Romeo  Subject: FW: from [facility] on [facility]- [facility] [place]            ----- Message -----  From: Betty Suero RN  Sent: 2/27/2025  11:21 AM CST  To: Sonia Clarke RN  Subject: from [facility] on [facility]- [facility] [place]              Recvd from [facility][1] Neuro...This patient would be a referral for Dr. Arevalo clinic / heard no early appointments for a year.. 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 Would appreciate that. Thanks for your time.Yes 22/30.. He has all studies done / but at outside center.. Will have it in hand by the time for appointment

## 2025-03-05 ENCOUNTER — TELEPHONE (OUTPATIENT)
Dept: NEUROLOGY | Facility: CLINIC | Age: 75
End: 2025-03-05
Payer: MEDICARE

## 2025-03-05 NOTE — TELEPHONE ENCOUNTER
----- Message from Med Assistant Geronimo sent at 3/5/2025  1:56 PM CST -----  Regarding: RE: Referral  Marco Jimenes,My name is Geronimo. I work with Dr. Koenig. At this time, all incoming referrals are on pause at this time. Dr. Koenig and I are creating a plan for these patients. It may be awhile but it might not be until June when this patients can be scheduled.  ----- Message -----  From: Yudy Lugo MA  Sent: 3/5/2025   1:13 PM CST  To: [facility] Staff  Subject: Referral                                         Good afternoon, Dr. Brittany Goldman has put in a referral for this patient to see Dr. Koenig at your soonest availability. Can someone please reach out to the patient to get him scheduled as soon as possible. Thank you,Yudy Lugo MA

## 2025-03-05 NOTE — TELEPHONE ENCOUNTER
----- Message from Ale sent at 3/5/2025 12:36 PM CST -----  Regarding: referral  Type:  Patient Returning CallName of who is calling:Mounika/wifeWhat is request in detail:Mounika is requesting a call back in regards to referral for Dr Koenig. She is unable to get an appt within this year for patient. She states Dr. Goldman would intervene and help if she was unable to get an appt. Mounika would like some assistance in getting this appt for patient.Can clinic reply by [facility]:noWhat number to call back if not in [facility]: [phone]

## 2025-03-12 ENCOUNTER — TELEPHONE (OUTPATIENT)
Dept: NEUROLOGY | Facility: CLINIC | Age: 75
End: 2025-03-12
Payer: MEDICARE

## 2025-03-12 NOTE — TELEPHONE ENCOUNTER
----- Message from JEANNIE Scott sent at 2/28/2025  3:22 PM CST -----  Regarding: FW: from [facility]- [facility] [place]    ----- Message -----  From: Earnest Koenig MD  Sent: 2/28/2025   1:08 PM CST  To: Sonia Clarke RN; Geronimo Romeo  Subject: RE: from [facility] on [facility]- [facility] [place]          I will see in resource clinic  ----- Message -----  From: Sonia Clarke RN  Sent: 2/28/2025  11:29 AM CST  To: Earnest Koenig MD; Geronimo Walters  Subject: FW: from [facility] on [facility]- [facility] [place]            ----- Message -----  From: Betty Suero RN  Sent: 2/27/2025  11:21 AM CST  To: Sonia Clarke RN  Subject: from Mug on [facility]- [facility] [place]              Recvd from [facility] Neuro...This patient would be a referral for Dr. Arevalo clinic / heard no early appointments for a year.. 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 Would appreciate that. Thanks for your time.Yes 22/30.. He has all studies done / but at outside center.. Will have it in hand by the time for appointment

## 2025-04-28 ENCOUNTER — TELEPHONE (OUTPATIENT)
Dept: UROLOGY | Facility: CLINIC | Age: 75
End: 2025-04-28
Payer: MEDICARE

## 2025-04-28 NOTE — TELEPHONE ENCOUNTER
Called and informed pt that   We regret to inform you that Dr Mota no longer is practicing at [facility].    Offer to reschedule your scheduled appt with one of our nurse practicioners    Estrella Lopez or Nancy Carlson   Or with one of the remaining urologist   Dr Gomez.Dr Estrella.Dr Noel    Pts wife was informed of the above and agreed with appt change to MD   Appt changed per pts wife preference

## 2025-05-27 ENCOUNTER — TELEPHONE (OUTPATIENT)
Dept: NEUROLOGY | Facility: CLINIC | Age: 75
End: 2025-05-27
Payer: MEDICARE

## 2025-05-27 NOTE — TELEPHONE ENCOUNTER
Spoke w/juan(wife) and I informed her that I would reach out to dr lillian petty office @ [phone] and I spoke w/the office and they will fax over all images that were performed to us @ [phone] and we are awaiting the records and as soon as we receive them I will call to let pt know         Thank you                ----- Message from Kranthi Hein sent at 5/27/2025  2:00 PM CDT -----    ----- Message -----  From: Charmaine Gayle  Sent: 5/27/2025   1:45 PM CDT  To: Susi Soto Staff    Type: Patient Call Back Who called:Wife (Juan) What is the request in detail:Pt wife checking the status of documents that was sent over from another provider she is requesting a call back from nurse  Can the clinic reply by [facility]? No Would the patient rather a call back or a response via [facility]? Call back Best call back number:.[phone]  Additional Information: Thank you.

## 2025-05-29 DIAGNOSIS — R41.3 OTHER AMNESIA: Primary | ICD-10-CM

## 2025-06-02 ENCOUNTER — TELEPHONE (OUTPATIENT)
Dept: NEUROLOGY | Facility: CLINIC | Age: 75
End: 2025-06-02
Payer: MEDICARE

## 2025-07-04 NOTE — PROGRESS NOTES
..[facility]  Brain [facility]     PATIENT: Kushal Morgan DDS  VISIT DATE: 2025  MRN: [id number]  PRIMARY PROVIDER: Rich Landa MD  : 1950    [place] Appointment:    Recent Clinical History:    Chief Complaint: Memory Changes/Decline.    Clinical Summary: A 74-year-old right-handed male presents with memory changes, accompanied by his wife, Mounika. These memory changes have worsened since he developed a urinary tract infection (UTI) while visiting Mounika's daughter in [place]. Mounika reports that her daughter, who is a physician assistant, noticed in 2024 that he appeared disoriented and walked out of the bedroom they were staying in wearing only his underwear. Another instance of concern occurred when Mounika observed him having difficulty following directions while helping her with wedding invitations. She instructed him to place the smaller RSVP card in front of the wedding announcement. However, when she later addressed the envelopes, she found that either the cards were placed in the wrong order or the wedding announcement or RSVP cards were missing altogether. His neurologist, Dr. LEESA Booker, conducted tests and diagnosed him with dementia of unknown origin. Despite this diagnosis, his Yadira scan was negative. He was subsequently referred to Dr. Sanchez and the memory and movement department for further evaluation. I have discussed neurodegenerative diseases with him and reviewed tauopathies and alpha-synucleinopathies, as well as confirmatory testing and current treatments. We also explored the possibility of anti-amyloid infusion therapy, should he be deemed a suitable candidate.    Medication Evaluation: A comprehensive review and reconciliation of the patient's current medications were performed. Medications were assessed for potential cognitive effects, interactions, and appropriateness. No medication concerns were identified at this time, and  "no adjustments were deemed necessary.  Safety Evaluation: The patient's safety was evaluated, including an assessment of the home environment, risk of falls, and other potential hazards. Motor vehicle operation was also assessed, and no safety concerns were identified at this time. No immediate recommendations or modifications were deemed necessary.  Caregiver Evaluation: The patient's primary caregiver was identified, and their knowledge, needs, and ability to provide care were evaluated. The caregiver denies needing assistance at this time but was provided with education and support resources for future reference, including requisite referrals as needed.  Advanced Care: The patient is currently documented as Full Code, meaning they have expressed a preference for all possible life-sustaining treatments to be administered in the event of a medical emergency, such as cardiopulmonary resuscitation (CPR), intubation, and mechanical ventilation. Further discussion regarding advance care planning was not conducted at this time.          Relevant History of Baseline Neurocognitive Phenotype:    Developmental Milestones: The patient/family report no known birth complications or early life problems. The patient met all developmental milestones.  Learning Neurodivergence: The patient/family report no signs or symptoms suggestive of developmental learning disorder.  Educational History: [employer] 2.5 years and went straight into Dental school after completing three years of college. 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  Vocational Neurocompatibility: [employer] Career: 4 - Social      Relevant Neurotrauma History:    History of Traumatic Brain Injury: "knocked out briefly" while playing football in  high school he " "reports getting up and resumed playing.  History of Brain/Spine Surgery: No History of Iatrogenic Brain Injury or CNS surgery injections to lower back lower back.  History of Toxin Exposure/Substance Abuse: mercury.  History of Malnutrition/Vitamin Deficiency: vitamin B and D  History of Chronic Mood Disorder/Stress: depression and anxiety divorce from first wife -   History of Chronic Inflammatory State: No History of CNS inflammation or Clinically Relevant Chronic Inflammatory State      Relevant Family History:    Relevant General History:  Mother- CA breast ( passed at 66 yo) and depression.  father- CA sarcoma of the leg, ( 75 yo ) due to PNA.  sister- " Lucy" 71 yo healthy.  sister- " Samira" 70 yo healthy CA- is taking chemo type of CA unknown.  brother- " Antonio" 72 yo heathy.  brother- " Mustapha" 66 yo healthy.  brother- "Mahamed" 64 yo depression.  brother- "James" 62 yo depression.  brother- "Bill" 60 yo healthy.  daughter- "Charmaine" 44 yo healthy.  son- " Mahamed" 46 yo- depression.  son- " Earnest " 46 yo depression as a child .  daughter- Kayla 50 yo - ADHD, depression.  son- "Latanya" 35 yo depression and bipolar.  Relevant Neurocognitive Disorder History:  " LB dementia" x 4 first cousin  LB dementia" name and ages of onset unknown. ( wife will try to obtain more information and upload via the patient portal)  Relevant Movement Disorder History:  two first paternal cousins dx with PD - ( Dr. Morgan's father was the baby of 12 children and wife reports they are all )  Relevant Motor Neuron Disease History:  The patient/family denies a history of ALS, MND, PLS.  Relevant Developmental/Neurodivergent History:  thinks all five children were treated for ADHD.  Relevant Psychiatric History:  daughter- bipolar ( Latanya)  Known Genetic Profile: There is no relevant genetic testing available on record.                Review of cognitive, visuospatial, motor, sensory, and behavioral systems:   "   Memory  The patient's memory has worsened relative to their baseline.  The patient does repeat statements or asks the same question repeatedly.  The patient does have difficulty remembering recent important conversations.  The patient does forget information within minutes.  the patient reports new difficulty with recall events with high fidelity/accuracy.  The patient does have difficulty remembering recent events.  The patient's recent retrograde memory is intact.  The patient's remote memory is intact.  Attention  The patient's attention and concentration are impaired.  The patient does have attentional fluctuations.  The patient does have difficulty with selective attention.  The patient does become easily distracted.  The patient does have difficulty with divided attention.  Executive  The patient's cognitive ability to process and respond to information has slowed.  The patient's cognitive ability to process and respond to information has severely slowed.  The patient's cognitive ability to estimate time requirements is leading to minor delays or missed deadlines.  The patient's cognitive ability to manage time is not severely effected.  The patient does have difficulty with working memory such as occasional reliance on external aids like notes.  The patient does misplace personal items (e.g., keys, cell phone, wallet) more frequently.  The patient does have significant working memory impairment interfering with day to day tasks.  The patient does have difficulty with Goal-Directed Behavior.  The patient does have difficulty keeping track of their medications.  The patient is not able to initiate the process of taking medications, requiring external prompts or requires caregiver intervention.  The patient does have difficulty with planning/organizing/completing multistep tasks requires assistance from others.  The patient experiences challenges with multi-step processes that require planning, sequencing, and  "follow-through, impacting their ability to manage complex tasks independently.  The patient does have difficulty with recognizing and adjusting for mistakes showing reoccurring inability to recognize or adjust for errors.  The patient's judgment is impaired, with observed difficulty evaluating risks, outcomes, and the appropriateness of actions.  The patient does have difficulty understanding abstract concepts or relationship.  The patient's insight into their disease and situation is impaired.  Language  The patient's speech has been affected. Comment: sometimes  The patient's speech is non-fluent and effortful.  The patient does forget places/people's names more frequently.  The patient does have word-finding difficulties.  The patient does make inaccurate word substitutions.  The patient's speech is not grammatically intact.  The patient does not appear to have impaired sentence fluency, repetition,and phonological processing.  The patient appears to have impaired comprehension.  The patient does not appear to have difficulty comprehending and understanding written text.  Visuospatial  The patient has become confused or disoriented in new, unfamiliar places.  The patient does have trouble navigating.  The patient has gotten lost in familiar places.  The patient does have visuospatial disorientation.  The patient has had problems with driving and/or parking. Comment: stopped driving 3/25.  The patient does not have difficulty recognizing objects or faces.  Motor/Coordination  The patient does have difficulty with walking.  The patient does feel imbalanced.  The patient has fallen. Comment: " it happens all the time"  The patient does appear to have new motor deficits.  The patient does have difficulty buttoning shirts, operating zippers, or manipulating tools/utensils.  The patient reports new slow, small dysrhythmic movement.  The patient does have a resting tremor. Comment: when reaching of a glass.  The " "patient's handwriting has not become micrographic.  The patient denies restlessness.  The patient does have new and/or worsening simple repetitive behaviors. Comment: throat clearing.  The patient denies a change of self-stimulating behavior.  The patient's speech has not become simplified or become repetitive/stereotyped.  The patient denies having any new involuntary movements and/or muscle jerking. Comment: patient says no but wife says " some" jerking has been visualized while awake.  The patient reports new muscle cramps and/or twitching. Comment: at night appears as RLS.  The patient does not have swallowing difficulty.  Sensory  The patient denies new numbness, tingling, paresthesias, or pain.  The patient denies a loss of vision, blurry vision, or double vision. Comment: he says no but wife reports he has mentioned having blurry vision.  The patient reports a recent loss of hearing and/or worsening tinnitus. Comment: he states no and wife says he mentioned ringing in his ears the past two days.  The patient denies anosmia.  The patient does not have hypersensitivity to environmental stimuli.  The patient does not have severe hypersensitivity to environmental stimuli.  Sleep  The patient reports difficulty sleeping. Comment: He goes to bed around [id number] he takes a shower and she has to remind to brush teeth. He does not have a set bedtime and it can range from [id number]  The patient does have difficulty going to sleep.  The patient reports difficulty staying asleep and/or frequently awakening at night. Comment: he wakes up between 12-15 times a night goes to the bathroom and then will eat and will eat up to 12 snacks night. He may or may not fall back to sleep within an hour. In the morning he will wake up between [id number].  The patient reports snoring.  The patient does snore and/or have witnessed apneas while sleeping. Comment: evaluation at [facility] sleep medicine. Is non-compliant with " CPAP.  When the patient wakes up in the morning, the patient does not feel well-rested. Comment: He will nap between 3- 7 x on average two hours each time.  The patient denies dream-enactment behavior.  The patient reports per-nocturnal jerking.  The patient has reported symptoms suggestive of restless leg syndrome.  Neurobehavioral  The patient's personality has changed.  The patient does difficulty with self-control. Comment: he is more irritable and shouting more.  The patient is exhibiting new symptoms that suggest they have become more impulsive, rash and/or careless.  The patient does have difficulty with Decision-Making resulting in occasional indecision or poor choices.  The patient does appear to have had a change in behavioral/emotional inertia.  The patient does not have apathy and/or decreased motivation.  The patient does not have difficulty with understanding and responding to social cues.  The patient is not exhibiting a diminished response to other people's needs and feelings.  The patient is not exhibiting symptoms of social withdrawal/indifference.  The patient is not exhibiting a diminished social interest, interrelatedness, or personal warmth.  The patient is exhibiting symptoms to suggest a loss of manners and/or decorum.  The patient does not have symptoms of disinhibition and social inappropriateness.  The patient's personal hygiene has become impaired.  The patient does not have difficulty with flexible thinking.  The patient denies any change in rigid behavior.  The patient does not have symptoms of hyper-religiosity or dogmatism.  The patient's interests/pleasures have not become restrictive, simplified, interrupting, or repetitive.  The patient has not shown new perseverative behavior.  The patient denies new/worsening complex repetitive/ritualistic compulsions and behaviors.  The patient has had changes in eating behavior. Comment: snacking more and eats more salty and sweets.  The  "patient denies increased consumption of food or substances. Comment: he has a couple of drinks a night but wife states if she did not limit it to two drinks a night he would consume more.  The patient denies oral exploration or consumption of inedible objects.  Psychiatric  The patient does have difficulty with managing emotional responses effectively.  The patient does have symptoms of irritability and mood lability. Comment: he is now angry and shouts- he has always been soft spoken until now.  The patient does not exhibit hyperactive behavior.  The patient has been reported to have new symptoms of agitation, aggression, or violent outbursts. Comment: no hitting "yet".  The patient's emotional expression has changed.  The patient does have emotional blunting.  The patient does feel depressed.  The patient does have anxiety.  The patient does exhibit cycling behavior. Comment: depends on the circumstances when he is being redirected by his wife or if she is repeating something. In the evening it "escalates" there is no set time for when his mood cycles . It is occurring several times a day.  The patient is exhibited symptoms of paranoia. Comment: three times she has seen him cry and verbalize "please don't leave me because I am sick"  The patient does not have delusions.  The patient does not have hallucinations.  Medical Review of Systems  The patient does have constipation.  The patient does have diarrhea.  The patient does have urinary incontinence. Comment: he wears diapers "constantly" he wets the bed and will go through five bed pad a night.  The patient reports symptoms that are suggestive of orthostatic lightheadedness.  The patient's weight is stable.  The patient does not have a history of sensitivity to neuroleptic/psychotropic medications.            Neurological Examination:     Mental Status  The patient's appearance was abnormal.  Throughout the interview, the patient is cooperative, the patient's " eye contact is appropriate.  The patient behavior was inappropriate to the clinical context and situation.  The patient behavior was not characterized by episodes of sudden uncontrollable and inappropriate laughing or crying.  The patient's energy level is abnormal.  The patient can complete three-step commands.  The patient fund of knowledge was appropriate for age, culture, and level of education.  The patient's thought process is logical and goal-oriented.  The patient demonstrated appropriate insight based on actions, awareness of the patient's illness, plans for the future.  The patient demonstrated impaired judgment based on actions and plans for the future.  The patient has no evidence of hallucinations (auditory, visual, olfactory).  The patient has no evidence of delusions (paranoid, grandiose, bizarre).  Cranial Nerves  The patient's eyelid assessment showed abnormalities.  The patient blink rate was abnormal.  The patient's facial expression was abnormal.  The patient's hearing was diminished.  Speech/Language  The patient's speech was fluent, non-effortful, and the patient's rate was appropriate to the context.  The patient's speech timbre is abnormal.  The patient's speech rate is abnormal.  The patient's respirations are within normal range and appropriate to context.  The patient's speech timbre is normal.  The patient has no articulation (segmental features) errors.  The patient's speech is not dysarthric.  The patient showed evidence of anomia.  The patient can comprehend commands that cross the midline (e.g., with your left thumb, touch your right ear).  The patient can comprehend commands that depend on syntax (e.g., point to the ceiling after you point to the floor).  The patient can comprehend syntactically complex sentences.  The patient's speech is grammatically intact; (no function/semantic word substitutions, phonemic/semantic paraphasias, or binary confusion).  Coordination  The patient  demonstrates no alien limb phenomena.  The patient has no dyskinetic movements.  The patient has no akathisia.  The patient's upper extremity fine motor coordination was not slow.  Higher Cortical Function  The patient showed no dysexecutive behavior.  The patient showed no utilization or imitation behavior.  The patient has no perseverative or stereotyped behavior.  The patient has no stimulus-bound behavior.  Gait  The patient has normal posture sitting unaided.            Functional Capacity Assessment: Neurological Wellbeing, Intelligence, and Social Evaluation:     Instrumental Activities of Daily Living:   Communal Operations: Severe level of inability to perform independantly.  Finances: Severe level of inability to perform independantly.  Housework: level of inability to perform independantly.  Personal Care: Mild level of inability to perform independantly.  Personal Health: Severe level of inability to perform independantly.  Recreation: Severe level of inability to perform independantly.  Transportation: Severe level of inability to perform independantly.  Basic Activities of Daily Living:   Axial Motor: level of inability to perform independantly.  Grooming: Moderate level of inability to perform independantly.  Hygeine: 1 level of inability to perform independantly.  Oropharngeal Motor: Normal level of functional independance.  Personal Health: Normal level of functional independance.  Toiletry: Normal level of functional independance.  Functional Capacity Scales:   IADL: Score 8/8 suggestive of Major dependence.  FAST: Score 7/16 suggestive of Moderately Severe Dementia (Stage 6b).  FAS: Score 26.5/30 suggestive of Definite Functional Impairment.  CDR-SOB: Score 9/18 suggestive of Mild dementia.  Global CDR: Score 1/3 suggestive of Prodromal/Very mild dementia.  Memory: 2  Orientation: 2  Judgment & Problem Solvin  Community Affairs: 1  Home and Hobbies: 1  Personal Care: 2            Clinical  Summary/Interpretation:    Neurobehavioral/Neuropsychiatric Evaluation Interpretation: The review of systems highlights various neurological and neurocognitive deficits, which can be linked to dysfunction in specific cortical and subcortical regions and their network pathologies. Memory issues such as mild autobiographical memory deficits, recent events, and working memory challenges suggest involvement of the medial temporal lobe and hippocampal network. Observed language impairments, including anomia and articulation difficulties, point to dysfunctions in the left temporal and frontal lobes, implicating Broca's and Wernicke's areas. Motor symptoms like gait imbalance and bradykinesia indicate possible basal ganglia or cerebellar dysfunction, while executive function deficits in organization and self-control hint at prefrontal cortex involvement. Mood instability, emotional lability, and social behavior changes reflect disruptions in the limbic system and its connections, affecting emotional regulation and social cognition networks.  BEHAV5+: Score 2/5 indicates significant behavioral symptoms which align with observed neuropsychiatric findings.  Neurological Examination Interpretation: The neurological examination revealed abnormalities in arousal, grooming, judgment, facial movement, speech, and behavior, suggesting dysfunctions in both cortical and subcortical regions. Impairments in arousal and behavior may indicate disruptions in the frontal cortex and associated networks, responsible for executive functions and decision-making. Eyelid apraxia and abnormal facial movements point to potential basal ganglia or frontal lobe involvement, affecting motor planning and execution. Speech difficulties, such as diminished pitch, tone, rate, and volume, are likely linked to disruptions in the motor cortex and basal ganglia circuits, impacting speech production and control.  Neurological Imaging Summary:  CT brain/head  without contrast performed on 11/22/2024  Radiologist Interpretation: Age-appropriate generalized involutional changes are seen. No evidence of acute intracranial hemorrhage, mass effect, midline deviation, hydrocephalus, or abnormal extra-axial fluid collection is visualized. There appears to be periventricular and deep white matter hypoattenuation which is nonspecific, but is most commonly associated with chronic microvascular ischemic changes. No evidence of acute large vessel territory ischemia/infarction is appreciated. MRI with diffusion-weighted imaging is more sensitive in the assessment of acute ischemia/infarction. The basilar cisterns are preserved. The visualized paranasal sinuses and mastoid air cells appear to be grossly clear. No acute displaced calvarial fracture is visualized. Impression: 1. No acute intracranial abnormality is visualized.  MRI brain/head without contrast performed on 11/15/2024  Radiologist Interpretation: brain imaging only - [facility]  CT brain/head without contrast performed on 02/12/2021  Radiologist Interpretation: The ventricles and sulci are prominent. There is no evidence of acute intracranial hemorrhage or infarct. There is no evidence of mass, mass effect, or midline shift. Decreased attenuation is present within the periventricular white matter. There are no intra-axial or extra axial fluid collections. The basal cisterns are patent. The visualized orbits are normal in appearance. Paranasal sinuses are clear. Osseous structures are unremarkable.  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     The patient's clinical profile,  considering their history and age/education-adjusted cognitive benchmarks, strongly indicates intact cognitive functioning.  Global Clinical Dementia Rating (CDR): 1/3 suggestive of Prodromal/Very mild dementia.  Clinical Dementia Rating Sum of Boxes (CDR-SOB): 9/18 suggestive of Mild dementia.  [facility] Functional Assessment Scale (FAS): 26.5/30 suggestive of Definite Functional Impairment.  Functional Assessment Staging Tool (FAST Scale): 7/16 suggestive of Moderately Severe Dementia (Stage 6b).  [place] Instrumental Activities of Daily Living Scale: 8/8 suggestive of Major dependence.     The clinical manifestation observed in the patient does not align with any specific neurodegenerative syndrome.     Currently, definitive diagnosis of all neurodegenerative diseases can only be achieved through a brain autopsy. The underlying neuropathology suspected to be causing the patient's neurocognitive impairment remains uncertain.   There are no plasma protein biomarkers available on record.  There are no cerebrospinal fluid protein biomarkers available on record.  There are no dermatological protein biomarkers available on record.  There is no relevant genetic biomarkers available on record.  Neurological Radiographic biomarkers include  CT brain/head without contrast performed on 11/22/2024  Radiologist Interpretation: Age-appropriate generalized involutional changes are seen. No evidence of acute intracranial hemorrhage, mass effect, midline deviation, hydrocephalus, or abnormal extra-axial fluid collection is visualized. There appears to be periventricular and deep white matter hypoattenuation which is nonspecific, but is most commonly associated with chronic microvascular ischemic changes. No evidence of acute large vessel territory ischemia/infarction is appreciated. MRI with diffusion-weighted imaging is more sensitive in the assessment of acute ischemia/infarction. The basilar cisterns are preserved. The  visualized paranasal sinuses and mastoid air cells appear to be grossly clear. No acute displaced calvarial fracture is visualized. Impression: 1. No acute intracranial abnormality is visualized.  MRI brain/head without contrast performed on 11/15/2024  CT brain/head without contrast performed on 02/12/2021  Radiologist Interpretation: The ventricles and sulci are prominent. There is no evidence of acute intracranial hemorrhage or infarct. There is no evidence of mass, mass effect, or midline shift. Decreased attenuation is present within the periventricular white matter. There are no intra-axial or extra axial fluid collections. The basal cisterns are patent. The visualized orbits are normal in appearance. Paranasal sinuses are clear. Osseous structures are unremarkable.            Impression:     The patient has a two-year history of rapidly progressive cognitive impairment, beginning in early 2022. Initially, the symptoms included difficulties with visual-spatial tasks, as observed by his wife during an incident where he misplaced components of a wedding invitation despite receiving clear instructions. By mid-2023, there was further cognitive decline, evidenced by disorientation and confusion, including an episode where he wandered out of a bedroom inappropriately dressed. His neurologist diagnosed him with dementia of unknown origin, although a Yadira scan returned negative results. Objective testing, such as the Maple Cognitive Assessment (MoCA) or the Mini-Mental State Examination (MMSE), likely indicates substantial impairments in memory and orientation, given the current severity of his symptoms. The neurological examination reveals deficits in general memory, attention, and executive function, along with gait disturbances and bradykinesia. 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 The overall neurobehavioral profile is consistent with a neurodegenerative condition that may involve both Alzheimers and Lewy body pathologies.    The above observations were discussed with the patient and their supporting historian(s). We have discussed the additional diagnostic(s) and/or management below.            Care Management Plan:     Diagnostic Screening for reversible forms of neurocognitive disorders  We recommend screening for reversible causes of neurocognitive impairment with plasma laboratories.  We have ordered plasma CBC, CMP, Vitamins (B1, B9, B12), Mg, RPR, MMA, HcY, TSH, T4, Nfl, [id number], [id number], GFAP serum.  We recommend screening for anatomical CNS lesions/neurodegenerative patterns.  We have ordered an MRI brain without contrast - Dementia Protocol-  - 7/21/25 1257 The patient H/H was 9.7/32 - I had Pacheco DENNIS reach out to wife letting her know it dropped from his last H/H 11.1/34.1 and to follow-up with [facility] PCP.  Optimize Sleep Hygiene and Quality  We discussed and recommended additional diagnostic/management of sleep disorder to optimize brain health and longevity.  We have placed referrals to sleep medicine due to signs and symptoms suggestive of sleep apnea.  Optimize Movement Disorder and Quality.  We have referred to Neurology-specific physical therapy/occupational therapy.  We have referred to Speech therapy for cognitive rehabilitation.  We have referred to Speech therapy for LVST.  Optimize Bladder Hygeine.  We have discussed important of timed voiding and medications like anticholinergics or beta-3 agonists.  We have discussed important of regular hydration.  Implement a regular toileting schedule. Maintain proper perineal hygiene.  Incorporate probiotics (e.g., Visbiome).  Use cranberry supplements (e.g.,  UTI-Stat).  Monitor for early signs of UTI  Consider periodic urinalysis.  Diagnostic Screening for measurable forms of neurodegenerative pathology.  We have discussed opportunities for biomarker testing (CSF Laguerre biomarkers, IDEAs Amyloid-PET, Syn-One skin biopsy).  We scheduled a skin biopsy for assessment of Syn-One alpha-synuclein related pathology.     The care plan was developed collaboratively with the patient and caregiver, addressing their goals of maintaining functional independence while planning for potential disease progression.     Thank you for entrusting us with the care of your patient. Should you have any questions or concerns, please feel free to contact us at your convenience.             Billing Statement:     I performed this consultation using real-time Telehealth tools, including a live video connection between my location and the patient's location (their home within the [facility]). Prior to initiating the consultation, I obtained informed verbal consent to perform this consultation using Telehealth tools and answered all the questions about the Telehealth interaction. The participants understood that only a limited neurological exam and limited neuropsychological testing could be performed using Telehealth tools.     I spent a total of 125 minutes (from 01:07 PM to 03:12 PM) on 07/15/2025, engaging in person in a face-to-face consultation with the patient. More than 50% of this time was devoted to counseling on symptoms, treatment plans, risks, therapeutic options, lifestyle modifications, and safety concerns related to the above diagnoses.     A Review of Systems was completed, encompassing 10 of the 14 systems. All findings were negative, except those noted in the History of Present Illness (HPI) and Review of Systems (ROS) Sections. The systems reviewed were Constitutional (Const), Eyes, Ear/Nose/Throat (ENT), Respiratory (Resp), Cardiovascular (CV), Gastrointestinal (GI),  Genitourinary (), Musculoskeletal (MSK), Skin, and Neurological (Neuro).     I spent a total of 20 minutes reviewing and summarizing records from outside physicians on the day of the clinical evaluation. This review and summary were conducted as described in the History of Present Illness (HPI) and Assessment.     Total Billing time spent on encounter/documentation for this patient's evaluation and management: 145 minutes.         This note was dictated using the M*Modal Fluency Direct voice recognition program. Please be aware that word recognition errors may occasionally occur and might be overlooked during review. [id number]

## 2025-07-15 ENCOUNTER — TELEPHONE (OUTPATIENT)
Facility: CLINIC | Age: 75
End: 2025-07-15
Payer: MEDICARE

## 2025-07-15 PROBLEM — N18.9 CKD (CHRONIC KIDNEY DISEASE): Status: ACTIVE | Noted: 2025-07-15

## 2025-07-15 PROBLEM — Z85.46 HISTORY OF PROSTATE CANCER: Status: ACTIVE | Noted: 2025-07-15

## 2025-07-15 PROBLEM — E21.3 HYPERPARATHYROIDISM: Status: ACTIVE | Noted: 2022-01-24

## 2025-07-15 PROBLEM — E87.5 HYPERKALEMIA: Chronic | Status: ACTIVE | Noted: 2021-05-14

## 2025-07-15 PROBLEM — I10 HYPERTENSION: Status: ACTIVE | Noted: 2019-09-30

## 2025-07-15 PROBLEM — E79.0 HYPERURICEMIA: Status: ACTIVE | Noted: 2021-05-14

## 2025-07-15 PROBLEM — E78.5 HYPERLIPIDEMIA: Status: ACTIVE | Noted: 2021-05-14

## 2025-07-15 PROBLEM — N26.9 GLOMERULOSCLEROSIS: Status: ACTIVE | Noted: 2022-01-24

## 2025-07-15 PROBLEM — N18.32 STAGE 3B CHRONIC KIDNEY DISEASE: Status: ACTIVE | Noted: 2021-05-14

## 2025-07-15 PROBLEM — C67.9 MALIGNANT NEOPLASM OF URINARY BLADDER: Status: ACTIVE | Noted: 2025-07-15

## 2025-07-15 PROBLEM — E29.1 HYPOGONADISM IN MALE: Status: ACTIVE | Noted: 2019-09-30

## 2025-07-15 PROBLEM — I12.9 HYPERTENSIVE RENAL DISEASE: Status: ACTIVE | Noted: 2022-01-24

## 2025-07-15 PROBLEM — F43.23 ADJUSTMENT DISORDER WITH MIXED ANXIETY AND DEPRESSED MOOD: Status: ACTIVE | Noted: 2018-09-12

## 2025-07-15 PROBLEM — R06.6 CHRONIC HICCUPS: Status: ACTIVE | Noted: 2025-07-15

## 2025-07-15 PROBLEM — G47.30 SLEEP APNEA: Status: ACTIVE | Noted: 2019-09-30

## 2025-07-15 PROBLEM — R80.9 PROTEINURIA: Chronic | Status: ACTIVE | Noted: 2021-05-14

## 2025-07-15 RX ORDER — BIMATOPROST 0.1 MG/ML
SOLUTION/ DROPS OPHTHALMIC
COMMUNITY
Start: 2025-03-06

## 2025-07-15 NOTE — TELEPHONE ENCOUNTER
Called and spoke with Pts spouse in regards to confirming tomorrows 7/16 vv with Kesha NP. Pts spouse confirmed and stated all current medications will be ready for discussion during the visit with Kesha. At this time, all is well.

## 2025-07-16 ENCOUNTER — LAB VISIT (OUTPATIENT)
Dept: LAB | Facility: HOSPITAL | Age: 75
End: 2025-07-16
Payer: MEDICARE

## 2025-07-16 ENCOUNTER — OFFICE VISIT (OUTPATIENT)
Facility: CLINIC | Age: 75
End: 2025-07-16
Payer: MEDICARE

## 2025-07-16 DIAGNOSIS — Z72.89 OTHER PROBLEMS RELATED TO LIFESTYLE: ICD-10-CM

## 2025-07-16 DIAGNOSIS — Z82.0 FAMILY HISTORY OF PARKINSON DISEASE: ICD-10-CM

## 2025-07-16 DIAGNOSIS — R41.3 MEMORY LOSS: ICD-10-CM

## 2025-07-16 DIAGNOSIS — Z85.51 HISTORY OF BLADDER CANCER: ICD-10-CM

## 2025-07-16 DIAGNOSIS — Z87.440 HISTORY OF UTI: ICD-10-CM

## 2025-07-16 DIAGNOSIS — G47.33 OSA (OBSTRUCTIVE SLEEP APNEA): ICD-10-CM

## 2025-07-16 DIAGNOSIS — R41.3 MEMORY LOSS: Primary | ICD-10-CM

## 2025-07-16 LAB
ABSOLUTE EOSINOPHIL (OHS): 0.1 K/UL
ABSOLUTE MONOCYTE (OHS): 0.75 K/UL (ref 0.3–1)
ABSOLUTE NEUTROPHIL COUNT (OHS): 5.85 K/UL (ref 1.8–7.7)
ALBUMIN SERPL BCP-MCNC: 4.5 G/DL (ref 3.5–5.2)
ALP SERPL-CCNC: 113 UNIT/L (ref 40–150)
ALT SERPL W/O P-5'-P-CCNC: 22 UNIT/L (ref 10–44)
ANION GAP (OHS): 8 MMOL/L (ref 8–16)
AST SERPL-CCNC: 19 UNIT/L (ref 11–45)
BASOPHILS # BLD AUTO: 0.01 K/UL
BASOPHILS NFR BLD AUTO: 0.1 %
BILIRUB SERPL-MCNC: 0.2 MG/DL (ref 0.1–1)
BUN SERPL-MCNC: 36 MG/DL (ref 8–23)
CALCIUM SERPL-MCNC: 9.7 MG/DL (ref 8.7–10.5)
CHLORIDE SERPL-SCNC: 106 MMOL/L (ref 95–110)
CO2 SERPL-SCNC: 27 MMOL/L (ref 23–29)
CREAT SERPL-MCNC: 1.8 MG/DL (ref 0.5–1.4)
ERYTHROCYTE [DISTWIDTH] IN BLOOD BY AUTOMATED COUNT: 16.8 % (ref 11.5–14.5)
FOLATE SERPL-MCNC: 13.2 NG/ML (ref 4–24)
GFR SERPLBLD CREATININE-BSD FMLA CKD-EPI: 39 ML/MIN/1.73/M2
GLUCOSE SERPL-MCNC: 102 MG/DL (ref 70–110)
HCT VFR BLD AUTO: 32 % (ref 40–54)
HGB BLD-MCNC: 9.7 GM/DL (ref 14–18)
IMM GRANULOCYTES # BLD AUTO: 0.02 K/UL (ref 0–0.04)
IMM GRANULOCYTES NFR BLD AUTO: 0.3 % (ref 0–0.5)
LYMPHOCYTES # BLD AUTO: 1.24 K/UL (ref 1–4.8)
MAGNESIUM SERPL-MCNC: 2.1 MG/DL (ref 1.6–2.6)
MCH RBC QN AUTO: 25.2 PG (ref 27–31)
MCHC RBC AUTO-ENTMCNC: 30.3 G/DL (ref 32–36)
MCV RBC AUTO: 83 FL (ref 82–98)
NUCLEATED RBC (/100WBC) (OHS): 0 /100 WBC
PLATELET # BLD AUTO: 354 K/UL (ref 150–450)
PMV BLD AUTO: 8.9 FL (ref 9.2–12.9)
POTASSIUM SERPL-SCNC: 4.6 MMOL/L (ref 3.5–5.1)
PROT SERPL-MCNC: 7.3 GM/DL (ref 6–8.4)
RBC # BLD AUTO: 3.85 M/UL (ref 4.6–6.2)
RELATIVE EOSINOPHIL (OHS): 1.3 %
RELATIVE LYMPHOCYTE (OHS): 15.6 % (ref 18–48)
RELATIVE MONOCYTE (OHS): 9.4 % (ref 4–15)
RELATIVE NEUTROPHIL (OHS): 73.3 % (ref 38–73)
SODIUM SERPL-SCNC: 141 MMOL/L (ref 136–145)
T PALLIDUM IGG+IGM SER QL: NORMAL
T4 FREE SERPL-MCNC: 0.94 NG/DL (ref 0.71–1.51)
TSH SERPL-ACNC: 1.4 UIU/ML (ref 0.4–4)
WBC # BLD AUTO: 7.97 K/UL (ref 3.9–12.7)

## 2025-07-16 PROCEDURE — 36415 COLL VENOUS BLD VENIPUNCTURE: CPT

## 2025-07-16 PROCEDURE — 83921 ORGANIC ACID SINGLE QUANT: CPT

## 2025-07-16 PROCEDURE — 83520 IMMUNOASSAY QUANT NOS NONAB: CPT

## 2025-07-16 PROCEDURE — 99417 PROLNG OP E/M EACH 15 MIN: CPT | Mod: 95,,, | Performed by: NURSE PRACTITIONER

## 2025-07-16 PROCEDURE — 85025 COMPLETE CBC W/AUTO DIFF WBC: CPT

## 2025-07-16 PROCEDURE — 4010F ACE/ARB THERAPY RXD/TAKEN: CPT | Mod: CPTII,95,, | Performed by: NURSE PRACTITIONER

## 2025-07-16 PROCEDURE — 98007 SYNCH AUDIO-VIDEO EST HI 40: CPT | Mod: 95,,, | Performed by: NURSE PRACTITIONER

## 2025-07-16 PROCEDURE — 83884 ASSAY NEURFLMNT LIGHT CHAIN: CPT

## 2025-07-16 PROCEDURE — 83520 IMMUNOASSAY QUANT NOS NONAB: CPT | Mod: 59

## 2025-07-16 PROCEDURE — 84425 ASSAY OF VITAMIN B-1: CPT

## 2025-07-16 PROCEDURE — 83735 ASSAY OF MAGNESIUM: CPT

## 2025-07-16 PROCEDURE — 1159F MED LIST DOCD IN RCRD: CPT | Mod: CPTII,95,, | Performed by: NURSE PRACTITIONER

## 2025-07-16 PROCEDURE — 84443 ASSAY THYROID STIM HORMONE: CPT

## 2025-07-16 PROCEDURE — 80053 COMPREHEN METABOLIC PANEL: CPT

## 2025-07-16 PROCEDURE — 82607 VITAMIN B-12: CPT

## 2025-07-16 PROCEDURE — 82746 ASSAY OF FOLIC ACID SERUM: CPT

## 2025-07-16 PROCEDURE — 1160F RVW MEDS BY RX/DR IN RCRD: CPT | Mod: CPTII,95,, | Performed by: NURSE PRACTITIONER

## 2025-07-16 PROCEDURE — 86593 SYPHILIS TEST NON-TREP QUANT: CPT

## 2025-07-16 PROCEDURE — 84439 ASSAY OF FREE THYROXINE: CPT

## 2025-07-16 PROCEDURE — 84393 TAU PHOSPHORYLATED EA: CPT

## 2025-07-16 RX ORDER — LACTOBACIL 2/BIFIDO 1/S.THERMO 450B CELL
1 PACKET (EA) ORAL DAILY
Qty: 30 CAPSULE | Refills: 3 | Status: SHIPPED | OUTPATIENT
Start: 2025-07-16

## 2025-07-18 LAB
IMMUNOLOGIST REVIEW: NORMAL
M PHOSPHO-TAU 217: 0.13 PG/ML
NEUROFILAMENT LIGHT CHAIN, PLASMA: 33 PG/ML
VIT B12 SERPL-MCNC: 442 NG/L (ref 180–914)

## 2025-07-20 LAB
LC GLIAL FIBRILLARY ACID PROTEIN: 97 PG/ML (ref 0–186)
LC PHOSPHO-TAU (181P): 0.93 PG/ML (ref 0–0.97)

## 2025-07-21 ENCOUNTER — TELEPHONE (OUTPATIENT)
Dept: NEUROLOGY | Facility: CLINIC | Age: 75
End: 2025-07-21
Payer: MEDICARE

## 2025-07-21 LAB — W METHYLMALONIC ACID: 0.47 UMOL/L

## 2025-07-21 NOTE — TELEPHONE ENCOUNTER
Spoke with pt's wife, Mounika, to report pt's h/h 9.7/32. Advised to call PCP with results; discussed PCP may recommend iron supplementation by mouth. Pt and wife deny any active bleeding or known cause for drop; denies any symptoms such as weakness, dizziness, elevated heart rate.   Name and number provides to Aydin Ribera to notify PCP of results and follow his recs.

## 2025-07-22 LAB — W VITAMIN B1: 59 UG/L

## 2025-07-23 ENCOUNTER — TELEPHONE (OUTPATIENT)
Dept: UROLOGY | Facility: CLINIC | Age: 75
End: 2025-07-23
Payer: MEDICARE

## 2025-07-23 NOTE — TELEPHONE ENCOUNTER
Copied from CRM #[id number]. Topic: General Inquiry - Patient Advice  >> Jul 23, 2025  1:34 PM Katrin wrote:  Type: Needs Medical Advice  Who Called:  Wife  Symptoms (please be specific):  UTI & Low Iron  How long has patient had these symptoms:  4 days or more  Pharmacy name and phone #:    [id number] - [place], [place] - [address]  [address]  [place] [address]  Phone: [phone] Fax: [phone]    Best Call Back Number: [phone]  Additional Information: Asking to get  some antibiotics called in today if possible. Pt has dementia was seeing Dr. Mota, have an upcoming appt with provider on 8/11. States that symptoms are getting worst.

## 2025-07-23 NOTE — TELEPHONE ENCOUNTER
Spoke w pts wife she reached out regarding poss pt having a possible uti   She voiced pcp has taking care of prescribing med for possible uti

## 2025-07-25 ENCOUNTER — CLINICAL SUPPORT (OUTPATIENT)
Dept: REHABILITATION | Facility: HOSPITAL | Age: 75
End: 2025-07-25
Payer: MEDICARE

## 2025-07-25 DIAGNOSIS — Z82.0 FAMILY HISTORY OF PARKINSON DISEASE: ICD-10-CM

## 2025-07-25 DIAGNOSIS — R41.3 MEMORY LOSS: ICD-10-CM

## 2025-07-25 PROCEDURE — 96125 COGNITIVE TEST BY HC PRO: CPT

## 2025-07-28 NOTE — PROGRESS NOTES
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    Memory loss     Family history of Parkinson disease      Physician: Kesha Reyes NP    Physician Orders: Eval and Treat  Medical Diagnosis: Memory loss  Family history of Parkinson disease  Surgical Diagnosis: Not applicable for this Episode   Surgical Date: Not applicable for this Episode  Days Since Last Surgery: Not applicable for this Episode    Visit # / Visits Authorized: 1 / 1   Insurance Authorization Period: 7/16/2025 to 7/16/2026  Date of Evaluation: 7/25/2025  Plan of Care Certification: 7/25/2025 to 9/22/25     Time In: 1415   Time Out: 1500  Total Time (in minutes): 45   Total Billable Time (in minutes): 45    Subjective   History of Present Illness  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     The patient reports a medical diagnosis of Memory loss (R41.3), Family history of Parkinson disease (Z82.0).                Patient presents in clinic breathing with Room air.             Prior Level of Function: Independent  Current Level of Function: Patient requires frequent cueing and caregiver support for orientation, memory, and completion of multi-step tasks    History of Present Condition/Illness: The patient is a 74-year-old male referred for speech-language pathology evaluation at [facility] due to progressive cognitive-linguistic decline and reported intermittent changes in speech clarity. According to his wife, subtle cognitive changes onset about one year ago. His condition significantly worsened following a hospitalization for a urinary tract infection (UTI), with memory and orientation further deteriorating since. Recurrent UTIs  have continued to exacerbate cognitive symptoms. The patient demonstrates severe short-term memory impairment (e.g., forgetting conversations within seconds), disorientation, difficulty with multi-step tasks, and episodes of slurred or mumbled speech. He has no prior history of speech therapy. Neurological workup diagnosed dementia of unknown origin; Yadira scan was negative. Additional findings include anomia, word-finding difficulty, impaired attention, working memory deficits, slowed processing, and visuospatial disorientation (e.g., getting lost in familiar places). He has ceased driving due to safety concerns. Imaging revealed age-appropriate atrophy and chronic microvascular ischemic changes. Functionally, he requires moderate to maximal assistance with instrumental activities of daily living and presents with impaired insight, safety awareness, and judgment. Current communication goals include improving memory, and attention to support functional communication and daily safety.  Patient has an upcoming neurology visit scheduled with Dr. Koenig on 8/14/25.    Current Speech Symptoms  Patient reports: Communication Changes and Speech Changes         Living Arrangements  Living Situation  Living Arrangements: Spouse/significant other  Support Systems: Spouse/significant other         Education and Employment  Patient reports highest level of education as Postgraduate degree.      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    Kushal has a current medication list which includes the following prescription(s): atorvastatin, benzonatate, bimatoprost, brimonidine 0.2%, bupropion, cyanocobalamin, dorzolamide-timolol 2-0.5%, ergocalciferol, gabapentin, visbiome, lansoprazole, lumigan, meloxicam, metoclopramide hcl, ramipril, and trazodone.    Review of patient's allergies indicates:  No Known Allergies     Objective    Auditory Comprehension  Responds to Name: Yes    Pointing to Items  Intact: Body Parts, Pictures, and Objects       Question Comprehension  Intact: Simple Yes/No Questions       Conversation Comprehension  Intact: Simple Conversation  Impaired: Complex Conversation       Auditory Comprehension Interference and Strategies  Components Interfering With Auditory Comprehension: Attention to detail, Motor processing, Working memory, Processing time                      Verbal Expression  Verbal Expression Assessment Supportive Technology Usage: No       Verbal Initiation, Termination, and Diffuse Language Characteristics  Intact: Verbal Initiation and Verbal Termination             Automatic Speech  Intact: Counting          Repetition  Intact: Words and Phrases          Responsive and Generative Naming  Intact: Responsive Naming and Generative Naming          Divergent Naming  Intact: [place] and Abstract          Narrative Speech  Intact: Image Description  Impaired: Storytelling and Conversational                   Cognition  The patient's level of consciousness is Alert. Orientation level is Disoriented to situation, Disoriented to time, Disoriented to place, and Inappropriate for developmental age.      Observed  memory impairments include Decreased short-term memory, Decreased long-term memory, Decreased immediate memory, and Decreased working memory.   Observed attention impairments include Alternating attention and Divided attention.   Processing speed is Delayed.      The patient demonstrates the ability to follow One-step commands. Patient able to follow one step commands during evaluation. Wife reports there is notable difficulty completing multi-step tasks within the home. Sequencing of tasks is Impaired. Sequencing impairments include Initiating tasks, Ordering tasks, Transitioning between tasks, and Terminating tasks. The following cues are required to support task sequencing: Verbal direct cue, Tactile cue, and Requires repeated cues.      Simple problem solving is Intact.    Complex problem solving is Impaired. The following cues are required to support complex problem solving: Verbal direct cue, Requires repeated cues, and Tactile cue.      Error recognition awareness is Intellectual.    Safety judgment is Impaired.              Cognitive-Communication  Verbal Reasoning  Impaired: Verbal Reasoning, Convergent Reasoning, and Divergent Reasoning     The following cues are required to support verbal reasoning:  Verbal direct cue, Requires repeated cues, and Modeling.      Social Communication  The patient's behavior and affect were observed and the patient was found to be Appropriate to situation, Calm, and Cooperative.       Joint attention was evaluated and the patient was Able to respond.    Intact: Response to Humor/Figurative Language, Attempts to Repair Communication Breakdowns, Eye Contact, and Turn Taking  Impaired: Topic Maintenance     The following cues are required to support social communication: Verbal direct cue and Requires repeated cues.            Executive Function  Impaired: Planning, Organization, and Mental Flexibility     The following cues are required to support executive function: Verbal  direct cue, Modeling, and Requires repeated cues            Communication Modes and Devices     Patient Expressive Communication Modes: Verbal  Verbalizations: Phrases/sentences    Current Receptive Communication Modes: Auditory  Caregiver/Familiar Person Required to Support Communication: Yes  Reason for Required Communication Support: Inconsistent communication attempts    Patient unable to provide or recall pertinent medical information during case history. Wife supported patient with recall of recent events and symptoms.       Standardized Cognitive Testing      Scales of Cognitive and Communicative Ability for Neurorehabilitation (SCCAN) was administered to assess cognitive and communicative functioning.        Raw Scores  Percentage Score  Oral Expression (OE)  19/19   100%   Orientation (OR)   3/12   25%  Memory (ME)    14/19   74%  Speech Comprehension (SP)  13/13   100%  Reading Comprehension (RD)  10/12   83%  Writing (WR)    6/7   86%  Attention (AT)    13/16   81%  Problem Solving (PS)   20/23   87%    Total Raw Score 75/94         %ile Rank <1% SCCAN Index 50 Degree of Severity Mild Impairment    SCCAN results indicate mild-to-moderate cognitive-communication impairment, with the patient demonstrating strongest performance in oral expression and speech comprehension. The most significant deficits were noted in the areas of orientation and memory, where the patient showed difficulty recalling temporal and situational information as well as retaining new verbal information over short intervals. These deficits are functionally impacting the patient's ability to participate independently in daily routines and conversations, and highlight the need for targeted intervention in memory support and orientation strategies.            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  Will Comorbidities Impact Care: Yes  Evolving nature of progressive disease could potentially impact treatment outcomes and patient care.     Diagnosis and Impressions: The patient presents with a cognitive-communication disorder characterized by deficits in short-term memory, working memory, attention, executive functioning, and expressive language. Communication is further impacted by intermittent reported slurred or mumbled speech, which reduces intelligibility at times. The patient demonstrates difficulty following multi-step directions, organizing thoughts, and recalling recent information, requiring frequent repetition and caregiver support. Expressive language is marked by word-finding difficulties and reduced verbal fluency, though receptive language and basic comprehension appear relatively preserved. Cognitive-communication deficits are impacting the patient's ability to participate in daily routines, manage safety, and maintain independence. These impairments are consistent with neurocognitive changes, possibly influenced by a history of recurrent infections (e.g., UTIs) and other neurological co morbidities. The patient is appropriate for skilled speech-language pathology services to address cognitive-communication impairments, improve functional safety, and provide caregiver education and compensatory strategy training.    Personal Factors Affecting Prognosis: Cognitive impairment    Evaluation/Treatment Response: Patient responded to treatment well     Patient Goal for Therapy (SLP): Current communication goals include improving memory, and attention to support functional communication and daily safety.  Prognosis: Fair       Plan  From a speech language pathology perspective, the patient would benefit from: Skilled Rehab Services  Planned therapy interventions and modalities include: Cognitive therapy and Patient/family education.              Visit Frequency: 2 times Per Week for 8  Weeks.       This plan was discussed with Patient and Caregiver.   Discussion participants: Agreed Upon Plan of Care           The patient's spiritual, cultural, and educational needs were considered, and the patient is agreeable to the plan of care and goals.     Goals:   Active       1. Short Term Goals       Patient will demonstrate the ability to initiate a self-care routine during daily activities with minimal to moderate verbal and visual prompts in 4 out of 5 opportunities.       Start:  07/28/25    Expected End:  09/22/25            Patient will follow simple, two- to three-step verbal directions during daily activities with minimal to moderate verbal and visual prompts in 4 out of 5 opportunities.       Start:  07/28/25    Expected End:  09/22/25            Patient will use a written orientation board or calendar to independently confirm the day, date, and location in 3 out of 5 opportunities during daily activities.        Start:  07/28/25    Expected End:  09/22/25            Patient will independently use at least two compensatory strategies (e.g., memory notebook, visual cues) to aid recall or communication during structured tasks with 80% accuracy.       Start:  07/28/25    Expected End:  09/22/25            Caregiver will learn and implement at least two communication strategies to support patient's understanding during daily interactions within 8 weeks.       Start:  07/28/25    Expected End:  09/22/25               2. Long Term Goals       Patient will demonstrate improved functional communication by independently participating in daily conversations with family members, showing improved memory recall, supporting safe and effective interaction.       Start:  07/28/25    Expected End:  09/22/25            Patient will use compensatory strategies (e.g., memory aids, written notes) to manage short-term memory deficits and follow multi-step instructions with minimal assistance in home and community  settings.       Start:  07/28/25    Expected End:  09/22/25            Caregiver will demonstrate effective use of memory aids and environmental modifications to support patient's safety and independence with daily activities, maintaining consistent support.       Start:  07/28/25    Expected End:  09/22/25                Andreia Cedeño CCC-SLP

## 2025-07-30 ENCOUNTER — CLINICAL SUPPORT (OUTPATIENT)
Dept: REHABILITATION | Facility: HOSPITAL | Age: 75
End: 2025-07-30
Payer: MEDICARE

## 2025-07-30 DIAGNOSIS — R41.3 MEMORY LOSS: Primary | ICD-10-CM

## 2025-07-30 DIAGNOSIS — R29.818 FINE MOTOR IMPAIRMENT: Primary | ICD-10-CM

## 2025-07-30 DIAGNOSIS — R29.898 FINE MOTOR IMPAIRMENT: Primary | ICD-10-CM

## 2025-07-30 DIAGNOSIS — R41.3 MEMORY LOSS: ICD-10-CM

## 2025-07-30 DIAGNOSIS — Z82.0 FAMILY HISTORY OF PARKINSON DISEASE: ICD-10-CM

## 2025-07-30 PROCEDURE — 97130 THER IVNTJ EA ADDL 15 MIN: CPT

## 2025-07-30 PROCEDURE — 97165 OT EVAL LOW COMPLEX 30 MIN: CPT

## 2025-07-30 PROCEDURE — 97129 THER IVNTJ 1ST 15 MIN: CPT

## 2025-07-30 NOTE — PROGRESS NOTES
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    Memory loss Yes     Physician: Kesha Reyes NP    Physician Orders: Eval and Treat  Medical Diagnosis: Memory loss  Family history of Parkinson disease  Surgical Diagnosis: Not applicable for this Episode   Surgical Date: Not applicable for this Episode  Days Since Last Surgery: Not applicable for this Episode    Visit # / Visits Authorized: 1 / 20   Insurance Authorization Period: 7/23/2025 to 12/31/2025  Date of Evaluation: 7/25/2025   Plan of Care Certification: 7/28/2025 to 9/22/2025      Time In: 1500   Time Out: 1545  Total Time (in minutes): 45   Total Billable Time (in minutes): 45       Subjective   Since the initial evaluation, no notable changes in memory function have been reported. Patient's wife was present for the session and provided additional context and support. Patient presented as pleasant, cooperative, and highly motivated to participate in therapy. Demonstrated a positive attitude toward treatment and expressed willingness to engage in strategies aimed at improving memory and daily cognitive functioning..  Pain reported as 0/10.    Family/caregiver present for this visit:       Objective            Treatment  Speech  Activity 1: Orientation  Activity 2: Self Care  Activity 3: Multistep Directions  Activity 4: Compensatory Strategies (Visual Supports - "To Do")  Activity 5: Caregiver Involvement    Time Entry(in minutes):  Cognitive Skill Development (Medicare) Time Entry: 45    Assessment & Plan   Assessment  Patient demonstrated the ability to recall self-care routine with daily activities with moderate verbal and visual prompts in 2 out of 5 opportunities. Patient followed simple, four-step visual directions during simulated daily activities with moderate verbal and " "visual prompts in 3 out of 5 opportunities. Patient able to independently confirm the day, date, and location in 4 out of 5 opportunities during daily activities. Patient provided with 1 compensatory strategy (e.g., visual cues e.g., "to do" list) to aid recall during morning routine.  Caregiver verbalized understanding of communication strategies provided to support patient's understanding during daily interactions (printed and sent home).  Evaluation/Treatment Tolerance: Patient tolerated treatment well    The patient will continue to benefit from skilled outpatient speech therapy in order to address the deficits listed in the problem list on the initial evaluation, provide patient and family education, and maximize the patients level of independence in the home and community environments.     The patient's spiritual, cultural, and educational needs were considered, and the patient is agreeable to the plan of care and goals.     Education  Education was done with Patient and Other recipient present. The patient's learning style includes Demonstration, Listening, and Reading. The patient Verbalizes understanding. Mounika participated in education. They identified as Spouse/significant other. The reported learning style is Listening. The recipient Demonstrates understanding and Verbalizes understanding.     Patient and wife sent home with the following: AM visual aid ("to do" list), Spaced Retrieval Training "How to", and WRAP memory strategies. Patient is to practice independently completing 2 tasks each morning (changing bed pad, bringing bathroom trash into kitchen) provided visual aids.          Plan  Continue current plan of care          Goals:   Active       1. Short Term Goals       Patient will demonstrate the ability to initiate a self-care routine during daily activities with minimal to moderate verbal and visual prompts in 4 out of 5 opportunities. (Progressing)       Start:  07/28/25    Expected End:  " 09/22/25            Patient will follow simple, two- to three-step verbal directions during daily activities with minimal to moderate verbal and visual prompts in 4 out of 5 opportunities. (Progressing)       Start:  07/28/25    Expected End:  09/22/25            Patient will use a written orientation board or calendar to independently confirm the day, date, and location in 3 out of 5 opportunities during daily activities.  (Progressing)       Start:  07/28/25    Expected End:  09/22/25            Patient will independently use at least two compensatory strategies (e.g., memory notebook, visual cues) to aid recall or communication during structured tasks with 80% accuracy. (Progressing)       Start:  07/28/25    Expected End:  09/22/25            Caregiver will learn and implement at least two communication strategies to support patient's understanding during daily interactions within 8 weeks. (Progressing)       Start:  07/28/25    Expected End:  09/22/25               2. Long Term Goals       Patient will demonstrate improved functional communication by independently participating in daily conversations with family members, showing improved memory recall, supporting safe and effective interaction. (Progressing)       Start:  07/28/25    Expected End:  09/22/25            Patient will use compensatory strategies (e.g., memory aids, written notes) to manage short-term memory deficits and follow multi-step instructions with minimal assistance in home and community settings. (Progressing)       Start:  07/28/25    Expected End:  09/22/25            Caregiver will demonstrate effective use of memory aids and environmental modifications to support patient's safety and independence with daily activities, maintaining consistent support. (Progressing)       Start:  07/28/25    Expected End:  09/22/25                Andreia Cedeño CCC-SLP

## 2025-08-01 PROBLEM — R29.818 FINE MOTOR IMPAIRMENT: Status: ACTIVE | Noted: 2025-08-01

## 2025-08-01 PROBLEM — R29.898 FINE MOTOR IMPAIRMENT: Status: ACTIVE | Noted: 2025-08-01

## 2025-08-01 NOTE — PATIENT INSTRUCTIONS
HOME EXERCISE PROGRAM  Created by TORRIE Beatty OTR/L  Jul 30th, 2025  View videos at [email or web address]        PUTTY     Place putty in your hand and squeeze it firmly and slowly. Reshape it and repeat.    Video # [id number] Repeat 10 Times   Complete 2 Sets   Perform 2 Times a Day          PUTTY PAD PINCH    Roll up some putty to create a small tubular section. Next, pinch the putty with the pad of your fingers and repeat down the section.    Video # [id number] Repeat 10 Times   Complete 2 Sets   Perform 2 Times a Day          putty marbles    Pt should hide marbles in putty place putty in ball on table and remove marbles one at a time with hand. Do not  putty. Pt may pinch or pull putty to find marbles. After retrieving marbles from putty light roll marbles to remove any excess putty before placing into container Complete 2 Sets   Perform 2 Times a Day          Finger Opposition    Touch the tip of the thumb to each finger tip one by one. Extend fingers fully after they are touched. Repeat 10 Times   Complete 2 Sets   Perform 2 Times a Day

## 2025-08-01 NOTE — PROGRESS NOTES
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    Memory loss     Family history of Parkinson disease     Fine motor impairment Yes     Physician: Kesha Reyes NP    Physician Orders: Eval and Treat  Medical Diagnosis: Memory loss  Family history of Parkinson disease  Surgical Diagnosis: Not applicable for this Episode   Surgical Date: Not applicable for this Episode  Days Since Last Surgery: Not applicable for this Episode    Visit # / Visits Authorized: 1 / 1  Insurance Authorization Period: 7/16/2025 to 7/16/2026  Date of Evaluation: 7/30/2025  Plan of Care Certification: 7/30/2025 to 9/26/25     Time In: 0105   Time Out: 0154  Total Time (in minutes): 49   Total Billable Time (in minutes): 49    Intake Outcome Measure for FOTO Survey    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   FOTO report - see Media section or FOTO account episode details.     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     The patient reports a medical diagnosis of R41.3 (ICD-10-CM) - Memory loss  Z82.0 (ICD-10-CM) - Family history of Parkinson disease.                      Dominant Hand: Right  History of Present Condition/Illness: Patient presents with his wife for initial evaluation. Patient is still undergoing testing at [facility], but per chart review, it has been confirmed that he has dementia. Per wife, his cognitive decline began about a year ago beginning with a UTI and has progressed since then & has worsened with recurrent UTIs. Patient wife reports she does most of the fine motor tasks for him as it takes him awhile to complete. This includes med  management, buttons, snaps, etc. Patient reports he is still able to tie his shoes. Wife reports the things he can do take him longer than it used to. She also reports tremors daily. No tremors present while evaluation is being performed. She reports it is more noticeable when his anxiety or agitation is increased. She notices it mostly when he is holding objects, like a coffee cup. She also reports decreased handwriting legibility. They report that the patient does have a  who he sees 2x/week working on both upper and lower body strengthening along with riding the bike. Patient reports he enjoys fishing, working out with his , going to the yacht club, and reading, however, he reports he has not been able to fish in awhile.     Activities of Daily Living  Social history was obtained from Patient and Spouse.    General Prior Level of Function Comments: IND  General Current Level of Function Comments: Pt wife reports pt requires set up assist with dressing along with maxA for buttons, snaps, med management, etc.  Patient Responsibilities: Personal ADL        Pain     Patient reports a current pain level of 0/10.               Living Arrangements  Living Situation  Housing: Home independently  Living Arrangements: Spouse/significant other  Support Systems: Spouse/significant other    Home Setup  Type of Structure: House  Home Access: Stairs with rails  Entrance Stairs - Number of Steps: 20  Entrance Stairs - Rails: Both  Patient is able to live on main floor of home: Yes  Primary Bedroom: 1st floor  Primary Bathroom: 1st floor    Equipment/Treatments  Mobility Equipment: Rollator    Patient wife reports they are currently looking at wheelchair options & are planning to purchase one for events that require longer distance walking. Their home has 20 steps to enter along with an elevator for entry. Patient and patient wife report that they typically try to use the stairs for entry more than the  elevator to maintain patient mobility.      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    Kushal has a current medication list which includes the following prescription(s): atorvastatin, benzonatate, bimatoprost, brimonidine 0.2%, bupropion, cyanocobalamin, dorzolamide-timolol 2-0.5%, ergocalciferol, gabapentin, visbiome, lansoprazole, lumigan, meloxicam, metoclopramide hcl, ramipril, and trazodone.    Review of patient's allergies indicates:  No Known Allergies     Objective      Shoulder Range of Motion     Shoulder, Elbow, or Forearm Range of Motion Details: Normal range of motion visually throughout upper extremity.            Right  Strength  Right Hand Dynamometer Position: 2  Elbow Position Forearm Position Trial 1 (lbs) Trial 2  (lbs) Trial 3  (lbs) Average  (lbs) Pain   Flexed Neutral 47 46 44 45.67         Left  Strength  Left Hand  Dynamometer Position: 2  Elbow Position Forearm Position Trial 1 (lbs) Trial 2 (lbs) Trial 3 (lbs) Average (lbs) Pain   Flexed Neutral 46 43 45 44.67         Right Pinch Strength   Trial 1 (lbs) Trial 2 (lbs) Trial 3 (lbs) Average (lbs) Pain   Lateral (Key Pinch) 18 18 17 17.67     Three Point (Three Jaw Jorge Luis) 12 11 11 11.33     Two Point (Tip to Tip)                 Left Pinch Strength   Trial 1 (lbs) Trial 2 (lbs) Trial 3 (lbs) Average (lbs) Pain   Lateral (Key Pinch) 18 17 17 17.33     Three Point (Three Jaw Jorge Luis) 10 10 11 10.33     Two Point (Tip to Tip)                        Wrist/Hand Special Tests  Hand Coordination Special Tests  Right 9-Hole Peg Test (sec): 28.9  Left 9-Hole Peg Test (sec): 31  Right In Hand Manipulation: Intact  Left In Hand Manipulation: Intact       Coordination  Quality of Movement Observations: Bradykinesia  Very mild bradykinesia with fine motor tasks.    Coordination Tests  Intact: Right Finger to Nose, Right Rapid Alternating Movements (Pronation/Supination), Right Rapid Alternating Movements (Digit Opposition), Right In Hand Manipulation, Left Finger to Nose, Left Rapid Alternating Movements (Pronation/Supination), Left Rapid Alternating Movements (Digit Opposition), and Left In Hand Manipulation  Right 9-Hole Peg Test (sec): 28.9  Left 9-Hole Peg Test (sec): 31  OT asked patient to perform fine motor tasks such as snaps, buttons, clips, threading shoe string, etc. Patient performed well with very minimal, if any, difficulty noted. Very mild bradykinesia when performing, but he performs impressively quickly in comparison to expectation following history from himself and wife. OT asked patient to doff watch he was wearing for observation of fine motor skill when performing this. He did very well with this demonstrating no difficulty.              Time Entry(in minutes):  OT Evaluation (Low) Time Entry: 49    Assessment & Plan   Assessment  Dr. Morgan presents with a condition of  Low complexity.   Presentation of Symptoms: Changing       ADL Limitations : Dressing, Functional mobility, Personal hygiene and grooming, Toileting and toilet hygiene  IADL Limitations: Community mobility, Driving, Financial management, Grocery/shopping, Health management and maintenance, Home establishment and management, Meal preparation and cleanup, Safety and emergency maintenance  Rest and Sleep Limitations: Disrupted sleep pattern  Work Limitations: [facility] preparation and adjustment  Leisure Limitations: Leisure exploration, Leisure participation  Functional Limitations: Activity tolerance, Fine motor coordination, Increased risk of fall, Manipulating objects         Personal Factors Affecting Prognosis: Cognitive impairment       Evaluation/Treatment Response: Patient responded to treatment well  Prognosis: Good  Assessment Details: Patient presents with medical diagnosis of R41.3 (ICD-10-CM) - Memory loss Z82.0 (ICD-10-CM) - Family history of Parkinson disease. His personal chief complaint is difficulty with memory, but he presents to OT for evaluation of fine motor tasks. While he does demonstrate mild bradykinesia when performing fine motor tasks, he does well with all tasks asked of him during evaluation. Some moderate loss of  and pinch strength noted during evaluation as well. He would benefit from skilled OT services to maintain fine motor coordination and increase /pinch strength.     Plan  From an occupational therapy perspective, the patient would benefit from: Skilled Rehab Services    Planned therapy interventions include: Therapeutic exercise, Therapeutic activities, Manual therapy, Neuromuscular re-education, ADLs/IADLs, and Orthotic management and training.    Planned modalities to include: Fluidotherapy, Paraffin bath, Ultrasound, and Thermotherapy (hot pack).        Visit Frequency: 1 times Per Week for 8 Weeks.       This plan was discussed with Patient.   Discussion  participants: Agreed Upon Plan of Care  Plan details: Patient and patient wife agreeable to 1x/week for OT services due to patient's performance during evaluation.          The patient's spiritual, cultural, and educational needs were considered, and the patient is agreeable to the plan of care and goals.           Goals:   Active       LTG       IND with HEP       Start:  08/01/25    Expected End:  09/26/25            Pt to increase BUE  strength by 7# in order to A in self care tasks IND by dc.        Start:  08/01/25    Expected End:  09/26/25            Patient will increase BUE fine motor coordination as demonstrated by increased 9 hole peg test by at least 5 seconds for increased IND with med management by 8 weeks.       Start:  08/01/25    Expected End:  09/26/25            Patient will be able to achieve less than or equal to 25% on the FOTO, demonstrating overall improved functional ability with upper extremity.  (Self-care category)        Start:  08/01/25    Expected End:  09/26/25               STG       Initiate HEP       Start:  08/01/25    Expected End:  08/29/25            Pt to increase BUE  strength by 3# in order to A in self care tasks IND by 4 weeks.        Start:  08/01/25    Expected End:  08/29/25            Patient will increase BUE fine motor coordination as demonstrated by increased 9 hole peg test by at least 3 seconds for increased IND with med management by 4 weeks.       Start:  08/01/25    Expected End:  08/29/25            Patient will be able to achieve less than or equal to 50% on the FOTO, demonstrating overall improved functional ability with upper extremity.  (Self-care category)        Start:  08/01/25    Expected End:  08/29/25                Shyann Chew OT

## 2025-08-04 ENCOUNTER — TELEPHONE (OUTPATIENT)
Facility: CLINIC | Age: 75
End: 2025-08-04
Payer: MEDICARE

## 2025-08-08 ENCOUNTER — CLINICAL SUPPORT (OUTPATIENT)
Dept: REHABILITATION | Facility: HOSPITAL | Age: 75
End: 2025-08-08
Payer: MEDICARE

## 2025-08-08 DIAGNOSIS — R41.3 MEMORY LOSS: Primary | ICD-10-CM

## 2025-08-08 PROCEDURE — 97129 THER IVNTJ 1ST 15 MIN: CPT

## 2025-08-08 PROCEDURE — 97130 THER IVNTJ EA ADDL 15 MIN: CPT

## 2025-08-08 NOTE — PROGRESS NOTES
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    Memory loss Yes     Physician: Kesha Reyes NP    Physician Orders: Eval and Treat  Medical Diagnosis: Memory loss  Family history of Parkinson disease  Surgical Diagnosis: Not applicable for this Episode   Surgical Date: Not applicable for this Episode  Days Since Last Surgery: Not applicable for this Episode    Visit # / Visits Authorized: 2 / 20   Insurance Authorization Period: 7/23/2025 to 12/31/2025  Date of Evaluation: 7/25/2025   Plan of Care Certification: 7/28/2025 to 9/22/2025      Time In: 1330   Time Out: 1415  Total Time (in minutes): 45   Total Billable Time (in minutes): 45         Subjective   Patient's wife stated that patient has starting two new over-the-counter supplements (a prebiotic and a postbiotic). Spouse reports noticeable cognitive improvements since these changes, including increased alertness, improved eye contact (improvement in glaze), and greater use of logic and reasoning. Patient states he did not feel the need to rely on strategies or materials reviewed in previous sessions. Although the patient himself does not perceive a significant difference, his wife and others have commented on clear cognitive improvements. Patient expresses decreased personal need for skilled speech therapy at this time but is motivated to continue attending sessions for maintenance and ongoing support..  Pain reported as 0/10.    Family/caregiver present for this visit:       Objective            Treatment  Speech  Activity 1: Immidate and Dealyed Recall Tasks (verbal)  Activity 2: Immitate and Delayed Recall Tasks (visual)  Activity 3: WRAP Memory Strategies  Activity 4: Review of current short term goals    Time Entry(in minutes):  Cognitive Skill Development (Medicare) Time  Entry: 45    Assessment & Plan   Assessment  Patient demonstrated the ability to recall self-care routine with daily activities with min verbal and visual prompts in 4 out of 5 opportunities. Patient followed simple, three-step visual directions during simulated daily activities independently in 5 out of 5 opportunities. Patient able to independently confirm the day, date, and location in 5 out of 5 opportunities during daily activities. Patient provided with 1 compensatory strategy (e.g., WRAP) to aid recall during memory tasks.  Caregiver verbalized understanding of communication strategies provided to support patient's understanding during daily interactions. However, at this time, both patient and spouse report that he does not require implementation of compensatory strategies during ADL's (he has demonstrated consistent independence).  Evaluation/Treatment Tolerance: Patient tolerated treatment well    The patient will continue to benefit from skilled outpatient speech therapy in order to address the deficits listed in the problem list on the initial evaluation, provide patient and family education, and maximize the patients level of independence in the home and community environments.     The patient's spiritual, cultural, and educational needs were considered, and the patient is agreeable to the plan of care and goals.     Education  Education was done with Patient and Other recipient present. The patient's learning style includes Demonstration, Listening, and Reading. The patient Verbalizes understanding. Mounika participated in education. They identified as Spouse/significant other. The reported learning style is Listening. The recipient Demonstrates understanding and Verbalizes understanding.     Patient and wife provided with education regarding continuation of current plan of care to maintain current cognitive gains and support through therapeutic intervention.         Plan  Continue current plan of  care          Goals:   Active       1. Short Term Goals       Patient will demonstrate the ability to initiate a self-care routine during daily activities with minimal to moderate verbal and visual prompts in 4 out of 5 opportunities. (Progressing)       Start:  07/28/25    Expected End:  09/22/25            Patient will follow simple, two- to three-step verbal directions during daily activities with minimal to moderate verbal and visual prompts in 4 out of 5 opportunities. (Progressing)       Start:  07/28/25    Expected End:  09/22/25            Patient will use a written orientation board or calendar to independently confirm the day, date, and location in 3 out of 5 opportunities during daily activities.  (Progressing)       Start:  07/28/25    Expected End:  09/22/25            Patient will independently use at least two compensatory strategies (e.g., memory notebook, visual cues) to aid recall or communication during structured tasks with 80% accuracy. (Progressing)       Start:  07/28/25    Expected End:  09/22/25            Caregiver will learn and implement at least two communication strategies to support patient's understanding during daily interactions within 8 weeks. (Progressing)       Start:  07/28/25    Expected End:  09/22/25               2. Long Term Goals       Patient will demonstrate improved functional communication by independently participating in daily conversations with family members, showing improved memory recall, supporting safe and effective interaction. (Progressing)       Start:  07/28/25    Expected End:  09/22/25            Patient will use compensatory strategies (e.g., memory aids, written notes) to manage short-term memory deficits and follow multi-step instructions with minimal assistance in home and community settings. (Progressing)       Start:  07/28/25    Expected End:  09/22/25            Caregiver will demonstrate effective use of memory aids and environmental  modifications to support patient's safety and independence with daily activities, maintaining consistent support. (Progressing)       Start:  07/28/25    Expected End:  09/22/25                Andreia Cedeño CCC-SLP

## 2025-08-11 ENCOUNTER — OUTPATIENT CASE MANAGEMENT (OUTPATIENT)
Dept: NEUROLOGY | Facility: CLINIC | Age: 75
End: 2025-08-11
Payer: MEDICARE

## 2025-08-11 ENCOUNTER — OFFICE VISIT (OUTPATIENT)
Dept: UROLOGY | Facility: CLINIC | Age: 75
End: 2025-08-11
Payer: MEDICARE

## 2025-08-11 VITALS — HEIGHT: 62 IN | BODY MASS INDEX: 24.6 KG/M2

## 2025-08-11 DIAGNOSIS — R46.89 COGNITIVE AND BEHAVIORAL CHANGES: Primary | ICD-10-CM

## 2025-08-11 DIAGNOSIS — N18.32 STAGE 3B CHRONIC KIDNEY DISEASE: ICD-10-CM

## 2025-08-11 DIAGNOSIS — Z85.51 HISTORY OF BLADDER CANCER: Primary | ICD-10-CM

## 2025-08-11 DIAGNOSIS — R41.89 COGNITIVE AND BEHAVIORAL CHANGES: Primary | ICD-10-CM

## 2025-08-11 LAB
BILIRUBIN, UA POC OHS: NEGATIVE
BLOOD, UA POC OHS: NEGATIVE
CLARITY, UA POC OHS: CLEAR
COLOR, UA POC OHS: YELLOW
GLUCOSE, UA POC OHS: NEGATIVE
KETONES, UA POC OHS: NEGATIVE
LEUKOCYTES, UA POC OHS: NEGATIVE
NITRITE, UA POC OHS: NEGATIVE
PH, UA POC OHS: 5.5
PROTEIN, UA POC OHS: NEGATIVE
SPECIFIC GRAVITY, UA POC OHS: 1.01
UROBILINOGEN, UA POC OHS: 0.2

## 2025-08-11 PROCEDURE — 1159F MED LIST DOCD IN RCRD: CPT | Mod: CPTII,S$GLB,, | Performed by: STUDENT IN AN ORGANIZED HEALTH CARE EDUCATION/TRAINING PROGRAM

## 2025-08-11 PROCEDURE — 3288F FALL RISK ASSESSMENT DOCD: CPT | Mod: CPTII,S$GLB,, | Performed by: STUDENT IN AN ORGANIZED HEALTH CARE EDUCATION/TRAINING PROGRAM

## 2025-08-11 PROCEDURE — 99214 OFFICE O/P EST MOD 30 MIN: CPT | Mod: S$GLB,,, | Performed by: STUDENT IN AN ORGANIZED HEALTH CARE EDUCATION/TRAINING PROGRAM

## 2025-08-11 PROCEDURE — 3008F BODY MASS INDEX DOCD: CPT | Mod: CPTII,S$GLB,, | Performed by: STUDENT IN AN ORGANIZED HEALTH CARE EDUCATION/TRAINING PROGRAM

## 2025-08-11 PROCEDURE — 88112 CYTOPATH CELL ENHANCE TECH: CPT | Mod: TC | Performed by: STUDENT IN AN ORGANIZED HEALTH CARE EDUCATION/TRAINING PROGRAM

## 2025-08-11 PROCEDURE — 99999 PR PBB SHADOW E&M-EST. PATIENT-LVL III: CPT | Mod: PBBFAC,,, | Performed by: STUDENT IN AN ORGANIZED HEALTH CARE EDUCATION/TRAINING PROGRAM

## 2025-08-11 PROCEDURE — 4010F ACE/ARB THERAPY RXD/TAKEN: CPT | Mod: CPTII,S$GLB,, | Performed by: STUDENT IN AN ORGANIZED HEALTH CARE EDUCATION/TRAINING PROGRAM

## 2025-08-11 PROCEDURE — 1101F PT FALLS ASSESS-DOCD LE1/YR: CPT | Mod: CPTII,S$GLB,, | Performed by: STUDENT IN AN ORGANIZED HEALTH CARE EDUCATION/TRAINING PROGRAM

## 2025-08-11 PROCEDURE — G2211 COMPLEX E/M VISIT ADD ON: HCPCS | Mod: S$GLB,,, | Performed by: STUDENT IN AN ORGANIZED HEALTH CARE EDUCATION/TRAINING PROGRAM

## 2025-08-11 PROCEDURE — 1126F AMNT PAIN NOTED NONE PRSNT: CPT | Mod: CPTII,S$GLB,, | Performed by: STUDENT IN AN ORGANIZED HEALTH CARE EDUCATION/TRAINING PROGRAM

## 2025-08-11 PROCEDURE — 81003 URINALYSIS AUTO W/O SCOPE: CPT | Mod: QW,S$GLB,, | Performed by: STUDENT IN AN ORGANIZED HEALTH CARE EDUCATION/TRAINING PROGRAM

## 2025-08-12 ENCOUNTER — CLINICAL SUPPORT (OUTPATIENT)
Dept: REHABILITATION | Facility: HOSPITAL | Age: 75
End: 2025-08-12
Payer: MEDICARE

## 2025-08-12 DIAGNOSIS — R41.3 MEMORY LOSS: Primary | ICD-10-CM

## 2025-08-12 DIAGNOSIS — R29.818 FINE MOTOR IMPAIRMENT: Primary | ICD-10-CM

## 2025-08-12 DIAGNOSIS — R29.898 FINE MOTOR IMPAIRMENT: Primary | ICD-10-CM

## 2025-08-12 PROCEDURE — 97110 THERAPEUTIC EXERCISES: CPT

## 2025-08-12 PROCEDURE — 97130 THER IVNTJ EA ADDL 15 MIN: CPT

## 2025-08-12 PROCEDURE — 97129 THER IVNTJ 1ST 15 MIN: CPT

## 2025-08-12 PROCEDURE — 97530 THERAPEUTIC ACTIVITIES: CPT

## 2025-08-13 LAB
ESTROGEN SERPL-MCNC: NORMAL PG/ML
INSULIN SERPL-ACNC: NORMAL U[IU]/ML
LAB AP GROSS DESCRIPTION: NORMAL
LAB AP PERFORMING LOCATION(S): NORMAL
LAB AP URINE CYTOLOGY INTERPRETATION SPECIMEN 1: NORMAL
T3RU NFR SERPL: NORMAL %

## 2025-08-14 ENCOUNTER — OUTPATIENT CASE MANAGEMENT (OUTPATIENT)
Dept: NEUROLOGY | Facility: CLINIC | Age: 75
End: 2025-08-14
Payer: MEDICARE

## 2025-08-14 ENCOUNTER — OFFICE VISIT (OUTPATIENT)
Dept: NEUROLOGY | Facility: CLINIC | Age: 75
End: 2025-08-14
Payer: MEDICARE

## 2025-08-14 VITALS
DIASTOLIC BLOOD PRESSURE: 67 MMHG | BODY MASS INDEX: 24.1 KG/M2 | HEIGHT: 61 IN | WEIGHT: 127.63 LBS | HEART RATE: 65 BPM | SYSTOLIC BLOOD PRESSURE: 107 MMHG

## 2025-08-14 DIAGNOSIS — G31.83 MODERATE LEWY BODY DEMENTIA WITH AGITATION: Primary | ICD-10-CM

## 2025-08-14 DIAGNOSIS — G20.C PARKINSONISM, UNSPECIFIED PARKINSONISM TYPE: ICD-10-CM

## 2025-08-14 DIAGNOSIS — F03.90 ADVANCING DEMENTIA: ICD-10-CM

## 2025-08-14 DIAGNOSIS — G31.83 MODERATE LEWY BODY DEMENTIA, UNSPECIFIED WHETHER BEHAVIORAL, PSYCHOTIC, OR MOOD DISTURBANCE OR ANXIETY: Primary | ICD-10-CM

## 2025-08-14 DIAGNOSIS — G47.01 INSOMNIA DUE TO MEDICAL CONDITION: ICD-10-CM

## 2025-08-14 DIAGNOSIS — N39.0 RECURRENT UTI: ICD-10-CM

## 2025-08-14 DIAGNOSIS — F02.B11 MODERATE LEWY BODY DEMENTIA WITH AGITATION: Primary | ICD-10-CM

## 2025-08-14 DIAGNOSIS — F02.B0 MODERATE LEWY BODY DEMENTIA, UNSPECIFIED WHETHER BEHAVIORAL, PSYCHOTIC, OR MOOD DISTURBANCE OR ANXIETY: Primary | ICD-10-CM

## 2025-08-14 DIAGNOSIS — Z82.0 FAMILY HISTORY OF PARKINSON DISEASE: ICD-10-CM

## 2025-08-14 PROCEDURE — 96132 NRPSYC TST EVAL PHYS/QHP 1ST: CPT | Mod: 59,S$GLB,, | Performed by: PSYCHIATRY & NEUROLOGY

## 2025-08-14 PROCEDURE — 96138 PSYCL/NRPSYC TECH 1ST: CPT | Mod: 59,S$GLB,, | Performed by: PSYCHIATRY & NEUROLOGY

## 2025-08-14 PROCEDURE — 99999 PR PBB SHADOW E&M-EST. PATIENT-LVL III: CPT | Mod: PBBFAC,GC,,

## 2025-08-14 RX ORDER — ASCORBIC ACID 125 MG
1 TABLET,CHEWABLE ORAL
Qty: 25 CAPSULE | Refills: 0 | Status: SHIPPED | OUTPATIENT
Start: 2025-08-14 | End: 2026-02-10

## 2025-08-14 RX ORDER — SUVOREXANT 20 MG/1
1 TABLET, FILM COATED ORAL NIGHTLY
Qty: 90 TABLET | Refills: 1 | Status: SHIPPED | OUTPATIENT
Start: 2025-08-21 | End: 2026-02-17

## 2025-08-14 RX ORDER — SUVOREXANT 20 MG/1
20 TABLET, FILM COATED ORAL NIGHTLY
Qty: 180 TABLET | Refills: 0 | Status: SHIPPED | OUTPATIENT
Start: 2025-08-21 | End: 2025-08-14

## 2025-08-14 RX ORDER — SUVOREXANT 10 MG/1
1 TABLET, FILM COATED ORAL NIGHTLY
Qty: 7 TABLET | Refills: 0 | Status: SHIPPED | OUTPATIENT
Start: 2025-08-14 | End: 2025-08-21

## 2025-08-14 RX ORDER — SUVOREXANT 10 MG/1
10 TABLET, FILM COATED ORAL NIGHTLY
Qty: 7 TABLET | Refills: 0 | Status: SHIPPED | OUTPATIENT
Start: 2025-08-14 | End: 2025-08-14

## 2025-08-19 ENCOUNTER — CLINICAL SUPPORT (OUTPATIENT)
Dept: REHABILITATION | Facility: HOSPITAL | Age: 75
End: 2025-08-19
Payer: MEDICARE

## 2025-08-19 ENCOUNTER — HOSPITAL ENCOUNTER (OUTPATIENT)
Dept: RADIOLOGY | Facility: HOSPITAL | Age: 75
Discharge: HOME | End: 2025-08-19
Attending: STUDENT IN AN ORGANIZED HEALTH CARE EDUCATION/TRAINING PROGRAM
Payer: MEDICARE

## 2025-08-19 DIAGNOSIS — Z85.51 HISTORY OF BLADDER CANCER: ICD-10-CM

## 2025-08-19 DIAGNOSIS — Z74.09 IMPAIRED FUNCTIONAL MOBILITY AND ACTIVITY TOLERANCE: Primary | ICD-10-CM

## 2025-08-19 LAB
CREAT SERPL-MCNC: 1.8 MG/DL (ref 0.5–1.4)
SAMPLE: ABNORMAL

## 2025-08-19 PROCEDURE — 25500020 PHARM REV CODE 255: Performed by: STUDENT IN AN ORGANIZED HEALTH CARE EDUCATION/TRAINING PROGRAM

## 2025-08-19 PROCEDURE — A9698 NON-RAD CONTRAST MATERIALNOC: HCPCS | Performed by: STUDENT IN AN ORGANIZED HEALTH CARE EDUCATION/TRAINING PROGRAM

## 2025-08-19 PROCEDURE — 74176 CT ABD & PELVIS W/O CONTRAST: CPT | Mod: TC

## 2025-08-19 PROCEDURE — 97112 NEUROMUSCULAR REEDUCATION: CPT

## 2025-08-19 PROCEDURE — 97530 THERAPEUTIC ACTIVITIES: CPT

## 2025-08-19 PROCEDURE — 74176 CT ABD & PELVIS W/O CONTRAST: CPT | Mod: 26,,, | Performed by: RADIOLOGY

## 2025-08-19 PROCEDURE — 97161 PT EVAL LOW COMPLEX 20 MIN: CPT

## 2025-08-19 RX ADMIN — IOHEXOL 1000 ML: 9 SOLUTION ORAL at 05:08

## 2025-08-20 PROBLEM — Z74.09 IMPAIRED FUNCTIONAL MOBILITY AND ACTIVITY TOLERANCE: Status: ACTIVE | Noted: 2025-08-20

## 2025-08-21 ENCOUNTER — PATIENT MESSAGE (OUTPATIENT)
Dept: NEUROLOGY | Facility: CLINIC | Age: 75
End: 2025-08-21
Payer: MEDICARE

## 2025-08-21 ENCOUNTER — HOSPITAL ENCOUNTER (OUTPATIENT)
Dept: CARDIOLOGY | Facility: CLINIC | Age: 75
Discharge: HOME | End: 2025-08-21
Payer: MEDICARE

## 2025-08-21 ENCOUNTER — PROCEDURE VISIT (OUTPATIENT)
Facility: CLINIC | Age: 75
End: 2025-08-21
Payer: MEDICARE

## 2025-08-21 VITALS
HEIGHT: 62 IN | DIASTOLIC BLOOD PRESSURE: 76 MMHG | WEIGHT: 135.94 LBS | SYSTOLIC BLOOD PRESSURE: 119 MMHG | BODY MASS INDEX: 25.02 KG/M2

## 2025-08-21 DIAGNOSIS — G31.83 MODERATE LEWY BODY DEMENTIA, UNSPECIFIED WHETHER BEHAVIORAL, PSYCHOTIC, OR MOOD DISTURBANCE OR ANXIETY: Primary | ICD-10-CM

## 2025-08-21 DIAGNOSIS — G31.83 MODERATE LEWY BODY DEMENTIA WITH AGITATION: ICD-10-CM

## 2025-08-21 DIAGNOSIS — F02.B11 MODERATE LEWY BODY DEMENTIA WITH AGITATION: ICD-10-CM

## 2025-08-21 DIAGNOSIS — F02.B0 MODERATE LEWY BODY DEMENTIA, UNSPECIFIED WHETHER BEHAVIORAL, PSYCHOTIC, OR MOOD DISTURBANCE OR ANXIETY: Primary | ICD-10-CM

## 2025-08-21 DIAGNOSIS — G20.C PARKINSONISM, UNSPECIFIED PARKINSONISM TYPE: ICD-10-CM

## 2025-08-21 LAB
OHS QRS DURATION: 86 MS
OHS QTC CALCULATION: 406 MS

## 2025-08-21 PROCEDURE — 11105 PUNCH BX SKIN EA SEP/ADDL: CPT | Mod: S$GLB,,, | Performed by: NURSE PRACTITIONER

## 2025-08-21 PROCEDURE — 11104 PUNCH BX SKIN SINGLE LESION: CPT | Mod: S$GLB,,, | Performed by: NURSE PRACTITIONER

## 2025-08-22 ENCOUNTER — SOCIAL WORK (OUTPATIENT)
Facility: CLINIC | Age: 75
End: 2025-08-22
Payer: MEDICARE

## 2025-08-22 ENCOUNTER — CLINICAL SUPPORT (OUTPATIENT)
Dept: REHABILITATION | Facility: HOSPITAL | Age: 75
End: 2025-08-22
Payer: MEDICARE

## 2025-08-22 DIAGNOSIS — R41.3 MEMORY LOSS: Primary | ICD-10-CM

## 2025-08-22 PROCEDURE — 97130 THER IVNTJ EA ADDL 15 MIN: CPT

## 2025-08-22 PROCEDURE — 97129 THER IVNTJ 1ST 15 MIN: CPT

## 2025-08-23 ENCOUNTER — PATIENT MESSAGE (OUTPATIENT)
Facility: CLINIC | Age: 75
End: 2025-08-23
Payer: MEDICARE

## 2025-08-25 ENCOUNTER — PATIENT MESSAGE (OUTPATIENT)
Facility: CLINIC | Age: 75
End: 2025-08-25
Payer: MEDICARE

## 2025-08-28 ENCOUNTER — CLINICAL SUPPORT (OUTPATIENT)
Dept: REHABILITATION | Facility: HOSPITAL | Age: 75
End: 2025-08-28
Payer: MEDICARE

## 2025-08-28 DIAGNOSIS — R41.3 MEMORY LOSS: Primary | ICD-10-CM

## 2025-08-28 PROCEDURE — 97130 THER IVNTJ EA ADDL 15 MIN: CPT

## 2025-08-28 PROCEDURE — 97129 THER IVNTJ 1ST 15 MIN: CPT

## 2025-09-01 PROBLEM — N18.9 CKD (CHRONIC KIDNEY DISEASE): Status: RESOLVED | Noted: 2025-07-15 | Resolved: 2025-09-01
